# Patient Record
Sex: FEMALE | Race: WHITE | NOT HISPANIC OR LATINO | Employment: FULL TIME | URBAN - METROPOLITAN AREA
[De-identification: names, ages, dates, MRNs, and addresses within clinical notes are randomized per-mention and may not be internally consistent; named-entity substitution may affect disease eponyms.]

---

## 2018-04-19 ENCOUNTER — NON-PROVIDER VISIT (OUTPATIENT)
Dept: URGENT CARE | Facility: PHYSICIAN GROUP | Age: 23
End: 2018-04-19

## 2018-04-19 DIAGNOSIS — Z11.1 PPD SCREENING TEST: ICD-10-CM

## 2018-04-19 PROCEDURE — 86580 TB INTRADERMAL TEST: CPT | Performed by: FAMILY MEDICINE

## 2018-04-19 NOTE — PROGRESS NOTES
Michelle Mccartney is a 22 y.o. female here for a non-provider visit for:   {MA VISIT IZ:35608}    Reason for immunization: {MA VISIT IZ REASON:05875}  Immunization records indicate need for vaccine: {MA YES NO IMM REC:21703}  Minimum interval has been met for this vaccine: {YES (DEF)/NO:52830}  ABN completed: {YES/NO/NOT INDICATED:43691}    Order and dose verified by: ***  VIS Dated  *** was given to patient: {YES (DEF)/NO:72390}  {MA VISIT IAC QUESTIONAIRE:40115}    Patient tolerated injection and no adverse effects were observed or reported: {YES (DEF)/NO:04400}    Pt scheduled for next dose in series: {YES/NO/NOT INDICATED:52978}

## 2018-04-19 NOTE — PROGRESS NOTES
Michelle Mccartney is a 22 y.o. female here for a non-provider visit for PPD placement -- Step 1 of 1    Reason for PPD:  work requirement    1. TB evaluation questionnaire completed by patient? Yes      -  If any answers marked yes did you contact a provider prior to placing? Not Indicated  2.  Patient notified to return to clinic for reading on: 04/21/2018 and 4/22/2018  3.  PPD Placement documentation completed on TB evaluation questionnaire? Yes  4.  Location of TB evaluation questionnaire filed: [facility]

## 2018-04-24 ENCOUNTER — NON-PROVIDER VISIT (OUTPATIENT)
Dept: URGENT CARE | Facility: PHYSICIAN GROUP | Age: 23
End: 2018-04-24

## 2018-04-24 DIAGNOSIS — Z11.1 PPD SCREENING TEST: ICD-10-CM

## 2018-04-24 PROCEDURE — 86580 TB INTRADERMAL TEST: CPT | Performed by: FAMILY MEDICINE

## 2018-04-26 ENCOUNTER — NON-PROVIDER VISIT (OUTPATIENT)
Dept: URGENT CARE | Facility: PHYSICIAN GROUP | Age: 23
End: 2018-04-26

## 2018-04-26 LAB — TB WHEAL 3D P 5 TU DIAM: NORMAL MM

## 2018-04-26 NOTE — NON-PROVIDER
Michelle Mccartney is a 22 y.o. female here for a non-provider visit for PPD reading -- Step 1 of 1.      1.  Resulted in Epic under enter/edit results? Yes   2.  TB evaluation questionnaire scanned into chart and original given to patient?Yes      3. Was induration greater than 0 mm? Yes, verified by Provider: induration was 4mm we do not need to do an xray if under 10mm.       Routed to PCP? No

## 2019-04-18 ENCOUNTER — NON-PROVIDER VISIT (OUTPATIENT)
Dept: URGENT CARE | Facility: PHYSICIAN GROUP | Age: 24
End: 2019-04-18

## 2019-04-18 DIAGNOSIS — Z11.1 PPD SCREENING TEST: ICD-10-CM

## 2019-04-18 PROCEDURE — 86580 TB INTRADERMAL TEST: CPT | Performed by: FAMILY MEDICINE

## 2019-04-20 ENCOUNTER — NON-PROVIDER VISIT (OUTPATIENT)
Dept: URGENT CARE | Facility: PHYSICIAN GROUP | Age: 24
End: 2019-04-20

## 2019-04-20 LAB — TB WHEAL 3D P 5 TU DIAM: NORMAL MM

## 2019-07-18 ENCOUNTER — GYNECOLOGY VISIT (OUTPATIENT)
Dept: OBGYN | Facility: CLINIC | Age: 24
End: 2019-07-18
Payer: MEDICAID

## 2019-07-18 ENCOUNTER — TELEPHONE (OUTPATIENT)
Dept: OBGYN | Facility: CLINIC | Age: 24
End: 2019-07-18

## 2019-07-18 VITALS — DIASTOLIC BLOOD PRESSURE: 76 MMHG | SYSTOLIC BLOOD PRESSURE: 112 MMHG | BODY MASS INDEX: 36.68 KG/M2 | WEIGHT: 194 LBS

## 2019-07-18 DIAGNOSIS — Z12.4 CERVICAL CANCER SCREENING: ICD-10-CM

## 2019-07-18 DIAGNOSIS — N97.9 SECONDARY FEMALE INFERTILITY: ICD-10-CM

## 2019-07-18 DIAGNOSIS — E66.9 OBESITY (BMI 35.0-39.9 WITHOUT COMORBIDITY): ICD-10-CM

## 2019-07-18 PROCEDURE — 99203 OFFICE O/P NEW LOW 30 MIN: CPT | Performed by: OBSTETRICS & GYNECOLOGY

## 2019-07-18 NOTE — NON-PROVIDER
Pt here for pap and questions regarding infertility  Pt states been trying to get pregnancy for 4 yrs.  Good#[phone]  Pharmacy verified

## 2019-07-18 NOTE — PROGRESS NOTES
GYN Visit    CC:  Desires pregnancy    HPI: Ms. Michelle Mccartney is a 24 y.o.  here for infertility evaluation.   Pt reports she has been trying to get pregnant for 4yrs since she had a mirena IUD removed.  She has unprotected intercourse multiple times per week.   q28d cycle, bleeds 3-4d; only first day is heavy, denies pain between cycles, minimal pain with cycles  Has tried OPKs took for 2-3mos, reports she was taking it for approx 1 week around 2nd week.  1 prior pregnancy, no trouble conceiving.  Has gained some weight since then.    History of STI: no   Abdomina/pelvic surgeries: no    Partner:   Age: 34  Medical problems: no  Tobacco use: no, drug use: no  Fathered other children: yes, father of her prior pregnancy       ROS:  gen: denies fevers, weight changes, general concerns  Eyes: neg  ENT: neg  CV:  Neg  Resp: neg  abd: denies abdominal pain, GI concerns  : denies pelvic pain, dyspareunia, irregular vaginal bleeding, vaginal discharge, dysuria  Endocrine: denies acne, changes in hair growth patterns, hotflashes/nighsweats, nipple discharge    History reviewed. No pertinent past medical history.  Past Surgical History:   Procedure Laterality Date   • PRIMARY C SECTION  2013    Performed by Lino Yanes M.D. at [facility]       Social History   Substance Use Topics   • Smoking status: Never Smoker   • Smokeless tobacco: Never Used   • Alcohol use Yes      Comment: Socially      FamHx: denies mproblems    Physical Exam:  /76   Wt 88 kg (194 lb)   BMI 36.68 kg/m²   gen: AAO, NAD  Neck: supple, no thyromegaly/masses  CV: RRR, no LE edema  resp: ctab  abd: soft, NT, ND, no masses, no hernias  : NEFG, normal urethral meatus, perineum and anus.  Normal vagina and cervix   Uterus small size, anteverted, no adnexal masses or tenderness   No uterosacral nodularity/tenderness  Skin: warm/dry, no lesions    A/P:  Discussed pregnancy requires 4 things to work to achieve  pregnancy and this is how I model workup as well.   Ovaries: ovulatory by history, no obvious signs of hyperandrogens.  will check day 3 FSH/estradiol, AMH as well as testosterone/DHEA-S/TSH, A1c   To use ovulation predictor kits   Consider progesterone level to confirm ovulation  Uterus: ultrasound rx given, cycles sound normal  Tubes: no risk factors for tubal disease although recommend HSG if possible, rx given and told to call to schedule during menses for right after cessation of menses.  Will need stat blood hCG same day as imaging, rx given  Sperm: low suspicion for male factor, will defer for now.    Recommend folic acid non    F/U in 4-6wks after workup to discuss results      Diane Landa MD  [facility], Women's Health

## 2019-07-18 NOTE — TELEPHONE ENCOUNTER
Thea from [facility] called stating we needed to change order for HSG to [address], per [employer] at [facility]. Notified Thea we did not tell patient to go there or specified location on order. Adv Thea they need to notify patient. Thea understood and had no further questions

## 2019-07-30 ENCOUNTER — HOSPITAL ENCOUNTER (OUTPATIENT)
Dept: RADIOLOGY | Facility: MEDICAL CENTER | Age: 24
End: 2019-07-30
Attending: OBSTETRICS & GYNECOLOGY
Payer: MEDICAID

## 2019-07-30 DIAGNOSIS — N97.9 SECONDARY FEMALE INFERTILITY: ICD-10-CM

## 2019-07-30 PROCEDURE — 76830 TRANSVAGINAL US NON-OB: CPT

## 2019-07-31 ENCOUNTER — TELEPHONE (OUTPATIENT)
Dept: OBGYN | Facility: CLINIC | Age: 24
End: 2019-07-31

## 2019-07-31 NOTE — TELEPHONE ENCOUNTER
Jesenia from the prior authorization dept called states she received a call from [facility] declining auth for U/S and hysterosalpingogram tests due to infertility Dx. Medicaid doesn't cover any infertility testing.    If any questions. Provider to call for a pear to pear consultation to [facility] medicaid @ [phone].    U/s ref #[id number]  And hysterosalpingogram ref#[id number]    Pt had u/s done on 7/30/19 and scheduled for hysterosalpingogram on 8/13.    msg will be sent to provider for advise.

## 2019-08-02 ENCOUNTER — TELEPHONE (OUTPATIENT)
Dept: OBGYN | Facility: CLINIC | Age: 24
End: 2019-08-02

## 2019-08-02 NOTE — TELEPHONE ENCOUNTER
I called to notify of insurance not covering infertility testing.  Was informed that if she continue getting tests done she will pay out of packet for those test and u/s already done.  Pt would like to continue w/testing.    States she will pay out of packet      ----- Message from Diane Landa M.D. sent at 8/1/2019  4:34 PM PDT -----  Regarding: RE: u/s refused  If you could notify patient will be great.  We discussed that she may not have coverage for all of the work-up related to her infertility.    ----- Message -----  From: Jacquie Amato, Med Ass't  Sent: 7/31/2019   3:42 PM PDT  To: Diane Landa M.D.  Subject: u/s refused                                      Jesenia Saab Dr. from the prior authorization dept called states she received a call from [facility] declining auth for U/S and hysterosalpingogram tests due to infertility Dx. Medicaid doesn't cover any infertility testing.    If any questions. Provider to call for a pear to pear consultation to \[facility]\"" medicaid @ [phone].    U/s ref #[id number]  And hysterosalpingogram ref#[id number]    Pt had u/s done on 7/30/19 and scheduled for hysterosalpingogram on 8/13.      Please let me know if I just need to notify pt or if you are going to call.  Thank you

## 2019-08-13 ENCOUNTER — APPOINTMENT (OUTPATIENT)
Dept: RADIOLOGY | Facility: MEDICAL CENTER | Age: 24
End: 2019-08-13
Attending: OBSTETRICS & GYNECOLOGY
Payer: MEDICAID

## 2019-08-20 ENCOUNTER — GYNECOLOGY VISIT (OUTPATIENT)
Dept: OBGYN | Facility: CLINIC | Age: 24
End: 2019-08-20
Payer: MEDICAID

## 2019-08-20 VITALS — WEIGHT: 193 LBS | SYSTOLIC BLOOD PRESSURE: 118 MMHG | DIASTOLIC BLOOD PRESSURE: 70 MMHG | BODY MASS INDEX: 36.49 KG/M2

## 2019-08-20 DIAGNOSIS — N97.0 INFERTILITY ASSOCIATED WITH ANOVULATION: ICD-10-CM

## 2019-08-20 PROCEDURE — 99213 OFFICE O/P EST LOW 20 MIN: CPT | Performed by: OBSTETRICS & GYNECOLOGY

## 2019-08-21 NOTE — PROGRESS NOTES
Subjective:      Michelle Mccartney is a 24 y.o. female who presents for f/u            HPI patient is a 24-year-old G1, P1 who presents today for follow-up of infertility evaluation.  She had ultrasound and blood work and would like to discuss results and discuss treatment.  Patient has no complaints currently.  She is taking prenatal vitamins and folic acid    ROS all organ systems were reviewed and were negative       Objective:     /70   Wt 87.5 kg (193 lb)   BMI 36.49 kg/m²      Physical Exam   Constitutional: She appears well-developed and well-nourished. No distress.   Skin: She is not diaphoretic.   Psychiatric: She has a normal mood and affect. Her behavior is normal. Judgment and thought content normal.   Nursing note and vitals reviewed.       Discussion:    #1. I discussed the ultrasound finding which shows normal anatomy except for polycystic appearing ovaries    #2. I discussed serum blood testing including ovarian function testing, prolactin, testosterone levels which were all within normal limits.    #3. I discussed treatment options including ovulation induction with Clomid and patient desires this treatment.  I reviewed Clomid therapy in detail including risks, benefits and possible complications with this medication.  Potential side effects were reviewed.  I discussed how to take medication 5 days/month, I reviewed timed intercourse, I discussed need for day 21-serum progesterone to make sure she is on an appropriate dose of Clomid and patient desires to start therapy.  Written instructions were provided and instructions and information on Clomid were also provided.       Assessment/Plan:     1. Infertility associated with anovulation  Patient presents today to discuss treatment plan results.  Results are within normal limits including ultrasound and blood work.  Patient desires Clomid therapy and was counseled on Clomid use and agrees for usage.  Precautions were discussed.  Clomid  was prescribed.  Written information were provided  - clomiPHENE (CLOMID) 50 MG tablet; Take 1 Tab by mouth every day.  Dispense: 15 Tab; Refill: 4    2.  Standing order for day 21 progesterone level placed    3.  Patient to continue prenatal vitamin and folic acid    We discussed plan for 6 months of Clomid therapy.  We discussed that if she cannot conceive during this timeframe she will be referred to infertility specialist and patient agrees.    22 minutes spent with patient today.  All time was for face-to-face counseling

## 2020-01-16 ENCOUNTER — GYNECOLOGY VISIT (OUTPATIENT)
Dept: OBGYN | Facility: CLINIC | Age: 25
End: 2020-01-16
Payer: MEDICAID

## 2020-01-16 VITALS — DIASTOLIC BLOOD PRESSURE: 64 MMHG | BODY MASS INDEX: 36.68 KG/M2 | SYSTOLIC BLOOD PRESSURE: 108 MMHG | WEIGHT: 194 LBS

## 2020-01-16 DIAGNOSIS — N97.9 INFERTILITY, FEMALE: ICD-10-CM

## 2020-01-16 PROCEDURE — 99214 OFFICE O/P EST MOD 30 MIN: CPT | Performed by: OBSTETRICS & GYNECOLOGY

## 2020-01-16 NOTE — PROGRESS NOTES
24 y.o.  female previously seen for : Chief Complaint:  infertility    Specialty Problems     None      . Patient now here in follow up.     Patient's last menstrual period was 2020.      Subjective: Abdominal Pain: negative    Vaginal Bleedingnegative  Menstrual Cycle: normal: negative  Dysmenorrhea:negative:   Dyspareunia:negative  Urinary Symptoms: negative   Vaginal Discharge:{No          Current Outpatient Medications:   •  clomiPHENE (CLOMID) 50 MG tablet, Take 1 Tab by mouth every day., Disp: 15 Tab, Rfl: 4  •  hydrocodone-acetaminophen (NORCO) 5-325 MG Tab per tablet, Take 1-2 Tabs by mouth every four hours as needed (prn moderate pain). (Patient not taking: Reported on 2019), Disp: 15 Tab, Rfl: 0  ROS: no change in ROS since visit of : 1/15/2020.  :No results found for this or any previous visit (from the past 336 hour(s)).    Vitals:    20 1402   BP: 108/64   Weight: 88 kg (194 lb)     No past medical history on file.  PGYN:   Social History     Socioeconomic History   • Marital status: Single     Spouse name: Not on file   • Number of children: Not on file   • Years of education: Not on file   • Highest education level: Not on file   Occupational History   • Not on file   Social Needs   • Financial resource strain: Not on file   • Food insecurity:     Worry: Not on file     Inability: Not on file   • Transportation needs:     Medical: Not on file     Non-medical: Not on file   Tobacco Use   • Smoking status: Never Smoker   • Smokeless tobacco: Never Used   Substance and Sexual Activity   • Alcohol use: Yes     Comment: Socially    • Drug use: No   • Sexual activity: Yes     Partners: Male     Comment: none   Lifestyle   • Physical activity:     Days per week: Not on file     Minutes per session: Not on file   • Stress: Not on file   Relationships   • Social connections:     Talks on phone: Not on file     Gets together: Not on file     Attends [employer] service: Not on  file     Active member of club or organization: Not on file     Attends meetings of clubs or organizations: Not on file     Relationship status: Not on file   • Intimate partner violence:     Fear of current or ex partner: Not on file     Emotionally abused: Not on file     Physically abused: Not on file     Forced sexual activity: Not on file   Other Topics Concern   • Not on file   Social History Narrative   • Not on file     No family history on file.  Past Surgical History:   Procedure Laterality Date   • PRIMARY C SECTION  4/29/2013    Performed by Lino Yanes M.D. at [facility]         Exam: deferred   Ass: 2nd infertility : attempting x 4 yrs   Spent 30 minutes minutes with the patient ; Face to Face, with >50% of this time spent in counseling and coordination of care, surrounding the above mentioned issues as well as: discussed need to begin , more intensive management of her fertility Rx . Patient has been at home taking  Clomid 50 mg Days 5-9, without any lab work to assess if meds effective .   No Semen analysis , etc . Patient as well not doing ovulation prediction . Will need to consider HSG as well P. Patient given instruction sheet : fertility check list        Hold meds for now         Progesterone  Days 21 and days 25    RTC after this   PNV .   Will need couple months off  Clomid . Or may switch to  Letrozole 2.5 mg days 3-7 Follow up : Return visit in 4 week(s)

## 2020-01-16 NOTE — LETTER
January 16, 2020        Michelle Mccartney      Infertility Check Sheet    First Day of period : day 1 of Menses.  ( all labs, appointments are centered around knowing this day)    Medication:     Clomid : take days (3-7)   Or  ( days 5-9)  From first day of menses    Urine Ovulation Prediction:  Days 11-16   from first day of menses    No sex from days 11-16    Timed [place]: 12-36 hrs from urine test postive  ( May have sex at 12/24 and 36 hr interval)    Additional Tests:   PCT ( post -coital test) Have sex day prior to urine ovulation surge). Present to doctors office within 2 hrs of sexual intercourse ( no shower, douching , etc)'  HSG : radiology exam , timed first 7 days of cycle. This test is only done , after others are negative, or if you have strong history of pelvic surgeries , and/or prior Ectopic Pregnancy    Blood Test:  You must get these tests every month , or clomid will not be refilled     Day 21 and day 25 from first day of menses    Schedule follow up appointment from day 27-30                             Jhoan Bella M.D.

## 2020-01-16 NOTE — NON-PROVIDER
Pt is here to follow up on taking clomid  She is still not pregnant and would like to discus different options  LMP 1/8/20

## 2020-01-21 ENCOUNTER — OFFICE VISIT (OUTPATIENT)
Dept: MEDICAL GROUP | Facility: MEDICAL CENTER | Age: 25
End: 2020-01-21
Attending: NURSE PRACTITIONER
Payer: MEDICAID

## 2020-01-21 VITALS
SYSTOLIC BLOOD PRESSURE: 90 MMHG | HEIGHT: 63 IN | TEMPERATURE: 98.5 F | HEART RATE: 56 BPM | WEIGHT: 190.1 LBS | OXYGEN SATURATION: 99 % | BODY MASS INDEX: 33.68 KG/M2 | DIASTOLIC BLOOD PRESSURE: 60 MMHG

## 2020-01-21 DIAGNOSIS — Z13.0 SCREENING FOR DEFICIENCY ANEMIA: ICD-10-CM

## 2020-01-21 DIAGNOSIS — Z13.21 ENCOUNTER FOR VITAMIN DEFICIENCY SCREENING: ICD-10-CM

## 2020-01-21 DIAGNOSIS — E66.9 OBESITY (BMI 30-39.9): ICD-10-CM

## 2020-01-21 DIAGNOSIS — Z23 NEED FOR VACCINATION: ICD-10-CM

## 2020-01-21 DIAGNOSIS — Z76.89 ENCOUNTER TO ESTABLISH CARE: ICD-10-CM

## 2020-01-21 DIAGNOSIS — Z11.3 ROUTINE SCREENING FOR STI (SEXUALLY TRANSMITTED INFECTION): ICD-10-CM

## 2020-01-21 DIAGNOSIS — Z13.228 SCREENING FOR METABOLIC DISORDER: ICD-10-CM

## 2020-01-21 DIAGNOSIS — Z13.6 SCREENING FOR CARDIOVASCULAR CONDITION: ICD-10-CM

## 2020-01-21 PROCEDURE — 99213 OFFICE O/P EST LOW 20 MIN: CPT | Mod: 25 | Performed by: NURSE PRACTITIONER

## 2020-01-21 PROCEDURE — 90686 IIV4 VACC NO PRSV 0.5 ML IM: CPT

## 2020-01-21 PROCEDURE — 99395 PREV VISIT EST AGE 18-39: CPT | Performed by: NURSE PRACTITIONER

## 2020-01-21 ASSESSMENT — PATIENT HEALTH QUESTIONNAIRE - PHQ9: CLINICAL INTERPRETATION OF PHQ2 SCORE: 0

## 2020-01-21 ASSESSMENT — ENCOUNTER SYMPTOMS
CONSTIPATION: 0
DIARRHEA: 0
WHEEZING: 0
COUGH: 0
FEVER: 0
WEIGHT LOSS: 0
SHORTNESS OF BREATH: 0
PALPITATIONS: 0
BLOOD IN STOOL: 0
ABDOMINAL PAIN: 0
CHILLS: 0

## 2020-01-21 NOTE — ASSESSMENT & PLAN NOTE
Prior PCP: Kenya Coleman    Other Providers:  [facility]    She is working with pregnancy center to try and get pregnant.  She has been trying for about 4 years.  She returns for an appt in 2020 for a f/u and to create a plan.  She is having regular periods. She is taking a  vitamin with folic acid.  She wants to get labs drawn to ensure she is in good health to get pregnant.

## 2020-01-21 NOTE — PROGRESS NOTES
Chief Complaint   Patient presents with   • Establish Care       Subjective:     HPI:   Michelle Mccartney is a 24 y.o. female here to establish care, lab requests,  and to discuss the evaluation and management of:      Encounter to establish care  Prior PCP: Kenya Coleman    Other Providers:  [facility]    She is working with pregnancy center to try and get pregnant.  She has been trying for about 4 years.  She returns for an appt in 2020 for a f/u and to create a plan.  She is having regular periods. She is taking a  vitamin with folic acid.  She wants to get labs drawn to ensure she is in good health to get pregnant.       ROS  Review of Systems   Constitutional: Negative for chills, fever, malaise/fatigue and weight loss.   Respiratory: Negative for cough, shortness of breath and wheezing.    Cardiovascular: Negative for chest pain, palpitations and leg swelling.   Gastrointestinal: Negative for abdominal pain, blood in stool, constipation and diarrhea.         No Known Allergies    Current medicines (including changes today)  Current Outpatient Medications   Medication Sig Dispense Refill   • Prenatal Vit-Fe Fumarate-FA (PRENATAL 1+1 PO) Take 1 Tab by mouth.       No current facility-administered medications for this visit.      She  has a past medical history of Anemia. She also has no past medical history of Allergy, ASTHMA, Blood transfusion, CATARACT, COPD, Diabetes, EMPHYSEMA, Headache(784.0), HIV (human immunodeficiency virus infection), Migraine, OSTEOPOROSIS, Ulcer, or Urinary tract infection, site not specified.  She  has a past surgical history that includes primary c section (2013).  Social History     Tobacco Use   • Smoking status: Never Smoker   • Smokeless tobacco: Never Used   Substance Use Topics   • Alcohol use: Yes     Comment: Socially    • Drug use: No       Family History   Family history unknown: Yes     Family Status   Relation Name Status   • Mo  Alive   •  "Fa  Alive   • Sis  Alive   • Bro  Alive   • MGMo  Alive   • MGFa  Alive   • PGMo  Other   • PGFa  Other   • Sis  Alive   • Tyree  Alive       Patient Active Problem List    Diagnosis Date Noted   • Encounter to establish care 01/21/2020   • Obesity (BMI 30-39.9) 01/21/2020   • Back pain 01/26/2015   • Obesity 01/26/2015          Objective:     BP (!) 90/60 (BP Location: Left arm, Patient Position: Sitting, BP Cuff Size: Adult)   Pulse (!) 56   Temp 36.9 °C (98.5 °F) (Temporal)   Ht 1.588 m (5' 2.5\")   Wt 86.2 kg (190 lb 1.6 oz)   SpO2 99%  Body mass index is 34.22 kg/m².    Physical Exam:  Physical Exam   Constitutional: She is oriented to person, place, and time and well-developed, well-nourished, and in no distress. No distress.   HENT:   Head: Normocephalic.   Right Ear: Tympanic membrane and external ear normal.   Left Ear: Tympanic membrane and external ear normal.   Eyes: Pupils are equal, round, and reactive to light. Conjunctivae and EOM are normal.   Neck: Normal range of motion. Neck supple. No tracheal deviation present.   Cardiovascular: Normal rate, regular rhythm, normal heart sounds and intact distal pulses.   Pulmonary/Chest: Effort normal and breath sounds normal.   Abdominal: Soft. Bowel sounds are normal.   Musculoskeletal: Normal range of motion.   Lymphadenopathy:        Head (right side): No preauricular adenopathy present.        Head (left side): No preauricular adenopathy present.     She has no cervical adenopathy.   Neurological: She is alert and oriented to person, place, and time. She has normal sensation, normal strength and intact cranial nerves. Gait normal.   Skin: Skin is warm and dry.   Psychiatric: Affect and judgment normal.     I have placed the below orders and discussed them with an approved delegating provider.  The MA is performing the below orders under the direction of Dr. Ziegler.       Assessment and Plan:     The following treatment plan was discussed:    1. " Encounter to establish care  New pt.  Labs ordered, see below for more info.  We ensure she is not anemic going into a potential pregnancy.  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     2. Need for vaccination  Influenza Vaccine Quad Injection (PF)   3. Obesity (BMI 30-39.9)  Patient identified as having weight management issue.  Appropriate orders and counseling given.   4. Screening for metabolic disorder  HEMOGLOBIN A1C    TSH WITH REFLEX TO FT4   5. Screening for cardiovascular condition     6. Screening for deficiency anemia  CBC WITHOUT DIFFERENTIAL   7. Encounter for vitamin deficiency screening  VITAMIN D,25 HYDROXY   8. Routine screening for STI (sexually transmitted infection)  HIV AG/AB COMBO ASSAY SCREENING    RPR (SYPHILIS)    Chlamydia/GC PCR Urine Or Swab       Any change or worsening of signs or symptoms, patient encouraged to follow-up or report to emergency room for further evaluation. Patient verbalizes understanding and agrees.    Follow-Up: Return if symptoms worsen or fail to improve.      PLEASE NOTE: This dictation was created using voice recognition software. I have made every reasonable attempt to correct obvious errors, but I expect that there are errors of grammar and possibly content that I did not discover before finalizing the note.

## 2020-01-30 DIAGNOSIS — N97.9 INFERTILITY, FEMALE: ICD-10-CM

## 2020-02-03 DIAGNOSIS — E55.9 VITAMIN D DEFICIENCY: ICD-10-CM

## 2020-02-03 RX ORDER — ERGOCALCIFEROL 1.25 MG/1
50000 CAPSULE ORAL
Qty: 8 CAP | Refills: 0 | Status: SHIPPED | OUTPATIENT
Start: 2020-02-03 | End: 2022-06-16

## 2020-02-03 NOTE — RESULT ENCOUNTER NOTE
Called and spoke to patient regarding all results.  Negative for HIV, diabetes, thyroid abnormalities, and anemia.  Vitamin D level was 16.  She is currently trying to get pregnant but is not pregnant now.  We will do ergocalciferol 50,000 units twice a week for 1 month and then recheck her level.  Hopefully we will be able to get this up to normal prior to her pregnancy.

## 2020-02-03 NOTE — PROGRESS NOTES
Vitamin D level 16, ergocalciferol 50,000 units twice weekly sent to pharmacy.  She is actively trying to get pregnant, expect the patient on the phone right now she is not currently pregnant.  We discussed taking this medication twice a week for 1 month and then rechecking her level, patient agreeable.  We will mail her a lab slip to recheck her level when she completes her prescription.

## 2020-02-13 ENCOUNTER — GYNECOLOGY VISIT (OUTPATIENT)
Dept: OBGYN | Facility: CLINIC | Age: 25
End: 2020-02-13
Payer: MEDICAID

## 2020-02-13 VITALS — WEIGHT: 186 LBS | BODY MASS INDEX: 33.48 KG/M2 | SYSTOLIC BLOOD PRESSURE: 118 MMHG | DIASTOLIC BLOOD PRESSURE: 66 MMHG

## 2020-02-13 DIAGNOSIS — N97.9 INFERTILITY, FEMALE: ICD-10-CM

## 2020-02-13 PROCEDURE — 99213 OFFICE O/P EST LOW 20 MIN: CPT | Performed by: OBSTETRICS & GYNECOLOGY

## 2020-02-13 NOTE — NON-PROVIDER
Pt here today for a GYN f/u.  Pt states that she was told to come back for an appointment after her labs were done.  Phone # [phone]  Pharmacy verified.

## 2020-02-13 NOTE — PROGRESS NOTES
24 y.o.  female previously seen for : Chief Complaint:  infertility    Specialty Problems     None      . Patient now here in follow up. Patient last seen 2020 , with c/o of 4 yrs of attempting pregnancy . Did have prior pregnancy : 6 yrs ago , with C/S . PAtient was asked     No LMP recorded. (Menstrual status: Other).  2020   2020  Interval : 32 days   Patient did not get Semen Analysis from [facility]  Did not check ovulation   Only performed day 21 blood test , not day 25     Subjective: Abdominal Pain: negative    Vaginal Bleedingnegative  Menstrual Cycle: normal: positive  Dysmenorrhea:positive:   Dyspareunia:negative  Urinary Symptoms: negative   Vaginal Discharge:{No          Current Outpatient Medications:   •  ergocalciferol (DRISDOL) 92703 UNIT capsule, Take 1 Cap by mouth every 3 days. (Patient not taking: Reported on 2020), Disp: 8 Cap, Rfl: 0  •  Prenatal Vit-Fe Fumarate-FA (PRENATAL 1+1 PO), Take 1 Tab by mouth., Disp: , Rfl:   ROS: no change in ROS since visit of : 2020.  :No results found for this or any previous visit (from the past 336 hour(s)).    Vitals:    20 1314   BP: 118/66   BP Location: Right arm   Patient Position: Sitting   Weight: 84.4 kg (186 lb)     Past Medical History:   Diagnosis Date   • Anemia     with pregnancy      PGYN:   Social History     Socioeconomic History   • Marital status: Single     Spouse name: Not on file   • Number of children: Not on file   • Years of education: Not on file   • Highest education level: Not on file   Occupational History   • Not on file   Social Needs   • Financial resource strain: Not on file   • Food insecurity     Worry: Not on file     Inability: Not on file   • Transportation needs     Medical: Not on file     Non-medical: Not on file   Tobacco Use   • Smoking status: Never Smoker   • Smokeless tobacco: Never Used   Substance and Sexual Activity   • Alcohol use: Yes     Comment: Socially    • Drug  use: No   • Sexual activity: Yes     Partners: Male     Comment: none   Lifestyle   • Physical activity     Days per week: Not on file     Minutes per session: Not on file   • Stress: Not on file   Relationships   • Social connections     Talks on phone: Not on file     Gets together: Not on file     Attends Synagogue service: Not on file     Active member of club or organization: Not on file     Attends meetings of clubs or organizations: Not on file     Relationship status: Not on file   • Intimate partner violence     Fear of current or ex partner: Not on file     Emotionally abused: Not on file     Physically abused: Not on file     Forced sexual activity: Not on file   Other Topics Concern   • Not on file   Social History Narrative   • Not on file     Family History   Family history unknown: Yes     Past Surgical History:   Procedure Laterality Date   • PRIMARY C SECTION  4/29/2013    Performed by Lino Yanes M.D. at [facility]         Exam: deferred     Day 21 : progesterone : 5.9     Ass: Borderline ovulatory response           Incomplete blood work and SA assessment       Spent 15 minutes minutes with the patient ; Face to Face, with >50% of this time spent in counseling and coordination of care, surrounding the above mentioned issues as well as: need to adher to schedule to assess problem  Prior to Rx .   P. Continue PNV       Need PRL, Testosterone       Prog  Day 21/25   Lab slips given       Urine ovulation prediction       Need SA   Follow up : Return visit in 4 week(s)

## 2020-02-13 NOTE — LETTER
February 13, 2020        Michelle Mccartney        Infertility Check Sheet    First Day of period : day 1 of Menses.  ( all labs, appointments are centered around knowing this day)    Medication:     Clomid : take days (3-7)   Or  ( days 5-9)  From first day of menses    Urine Ovulation Prediction:  Days 11-16   from first day of menses    No sex from days 11-16    Timed [place]: 12-36 hrs from urine test postive  ( May have sex at 12/24 and 36 hr interval)    Additional Tests:   PCT ( post -coital test) Have sex day prior to urine ovulation surge). Present to doctors office within 2 hrs of sexual intercourse ( no shower, douching , etc)'  HSG : radiology exam , timed first 7 days of cycle. This test is only done , after others are negative, or if you have strong history of pelvic surgeries , and/or prior Ectopic Pregnancy    Blood Test:  You must get these tests every month , or clomid will not be refilled     Day 21 and day 25 from first day of menses    Schedule follow up appointment from day 27-30                           Jhoan Bella M.D.

## 2021-06-08 ENCOUNTER — HOSPITAL ENCOUNTER (OUTPATIENT)
Dept: LAB | Facility: MEDICAL CENTER | Age: 26
End: 2021-06-08
Attending: STUDENT IN AN ORGANIZED HEALTH CARE EDUCATION/TRAINING PROGRAM
Payer: COMMERCIAL

## 2021-06-08 LAB
BASOPHILS # BLD AUTO: 1 % (ref 0–1.8)
BASOPHILS # BLD: 0.08 K/UL (ref 0–0.12)
CHOLEST SERPL-MCNC: 146 MG/DL (ref 100–199)
EOSINOPHIL # BLD AUTO: 0.1 K/UL (ref 0–0.51)
EOSINOPHIL NFR BLD: 1.2 % (ref 0–6.9)
ERYTHROCYTE [DISTWIDTH] IN BLOOD BY AUTOMATED COUNT: 44.6 FL (ref 35.9–50)
EST. AVERAGE GLUCOSE BLD GHB EST-MCNC: 91 MG/DL
HBA1C MFR BLD: 4.8 % (ref 4–5.6)
HCT VFR BLD AUTO: 40.7 % (ref 37–47)
HDLC SERPL-MCNC: 49 MG/DL
HGB BLD-MCNC: 13.3 G/DL (ref 12–16)
IMM GRANULOCYTES # BLD AUTO: 0.05 K/UL (ref 0–0.11)
IMM GRANULOCYTES NFR BLD AUTO: 0.6 % (ref 0–0.9)
LDLC SERPL CALC-MCNC: 77 MG/DL
LYMPHOCYTES # BLD AUTO: 2.37 K/UL (ref 1–4.8)
LYMPHOCYTES NFR BLD: 29.6 % (ref 22–41)
MCH RBC QN AUTO: 29.4 PG (ref 27–33)
MCHC RBC AUTO-ENTMCNC: 32.7 G/DL (ref 33.6–35)
MCV RBC AUTO: 89.8 FL (ref 81.4–97.8)
MONOCYTES # BLD AUTO: 0.43 K/UL (ref 0–0.85)
MONOCYTES NFR BLD AUTO: 5.4 % (ref 0–13.4)
NEUTROPHILS # BLD AUTO: 4.98 K/UL (ref 2–7.15)
NEUTROPHILS NFR BLD: 62.2 % (ref 44–72)
NRBC # BLD AUTO: 0 K/UL
NRBC BLD-RTO: 0 /100 WBC
PLATELET # BLD AUTO: 297 K/UL (ref 164–446)
PMV BLD AUTO: 9.5 FL (ref 9–12.9)
RBC # BLD AUTO: 4.53 M/UL (ref 4.2–5.4)
TRIGL SERPL-MCNC: 98 MG/DL (ref 0–149)
TSH SERPL DL<=0.005 MIU/L-ACNC: 1.11 UIU/ML (ref 0.38–5.33)
WBC # BLD AUTO: 8 K/UL (ref 4.8–10.8)

## 2021-06-08 PROCEDURE — 87591 N.GONORRHOEAE DNA AMP PROB: CPT

## 2021-06-08 PROCEDURE — 80061 LIPID PANEL: CPT

## 2021-06-08 PROCEDURE — 83036 HEMOGLOBIN GLYCOSYLATED A1C: CPT

## 2021-06-08 PROCEDURE — 36415 COLL VENOUS BLD VENIPUNCTURE: CPT

## 2021-06-08 PROCEDURE — 87536 HIV-1 QUANT&REVRSE TRNSCRPJ: CPT

## 2021-06-08 PROCEDURE — 86780 TREPONEMA PALLIDUM: CPT

## 2021-06-08 PROCEDURE — 85025 COMPLETE CBC W/AUTO DIFF WBC: CPT

## 2021-06-08 PROCEDURE — 86701 HIV-1ANTIBODY: CPT

## 2021-06-08 PROCEDURE — 87491 CHLMYD TRACH DNA AMP PROBE: CPT

## 2021-06-08 PROCEDURE — 84443 ASSAY THYROID STIM HORMONE: CPT

## 2021-06-08 PROCEDURE — 86702 HIV-2 ANTIBODY: CPT

## 2021-06-09 LAB
C TRACH DNA SPEC QL NAA+PROBE: NEGATIVE
N GONORRHOEA DNA SPEC QL NAA+PROBE: NEGATIVE
SPECIMEN SOURCE: NORMAL
TREPONEMA PALLIDUM IGG+IGM AB [PRESENCE] IN SERUM OR PLASMA BY IMMUNOASSAY: NORMAL

## 2021-06-10 LAB
HIV 1 & 2 AB SER-IMP: NORMAL
HIV 1 & 2 AB SERPL IA.RAPID: NORMAL
HIV 2 AB SERPL QL IA: NEGATIVE
HIV1 AB SERPL QL IA: NEGATIVE

## 2021-06-12 LAB
HIV-1 NAAT (COPIES/ML) L204479A: NOT DETECTED CPY/ML
HIV-1 NAAT (LOG COPIES/ML) L295410: NOT DETECTED LOG CPY/ML
HIV1 RNA SERPL QL NAA+PROBE: NOT DETECTED

## 2021-11-22 ENCOUNTER — OFFICE VISIT (OUTPATIENT)
Dept: MEDICAL GROUP | Facility: CLINIC | Age: 26
End: 2021-11-22
Payer: COMMERCIAL

## 2021-11-22 VITALS
WEIGHT: 173 LBS | RESPIRATION RATE: 18 BRPM | OXYGEN SATURATION: 98 % | HEART RATE: 94 BPM | DIASTOLIC BLOOD PRESSURE: 68 MMHG | BODY MASS INDEX: 32.66 KG/M2 | HEIGHT: 61 IN | SYSTOLIC BLOOD PRESSURE: 106 MMHG | TEMPERATURE: 98 F

## 2021-11-22 DIAGNOSIS — Z11.3 SCREEN FOR STD (SEXUALLY TRANSMITTED DISEASE): ICD-10-CM

## 2021-11-22 DIAGNOSIS — N30.00 ACUTE CYSTITIS WITHOUT HEMATURIA: ICD-10-CM

## 2021-11-22 PROCEDURE — 99212 OFFICE O/P EST SF 10 MIN: CPT | Performed by: FAMILY MEDICINE

## 2021-11-22 ASSESSMENT — PATIENT HEALTH QUESTIONNAIRE - PHQ9: CLINICAL INTERPRETATION OF PHQ2 SCORE: 0

## 2021-11-22 NOTE — PROGRESS NOTES
"    CC:           STD testing                                                                                                                           HPI:   Michelle presents today with the following.  She would like to be screened for STDs.  She is not having any symptoms, she does not have any history of STD and she is not concerned that she had any sexual activity with anyone who did have an STD.    Patient Active Problem List    Diagnosis Date Noted   • Screen for STD (sexually transmitted disease) 11/22/2021   • Encounter to establish care 01/21/2020   • Obesity (BMI 30-39.9) 01/21/2020   • Back pain 01/26/2015   • Obesity 01/26/2015       Current Outpatient Medications   Medication Sig Dispense Refill   • ergocalciferol (DRISDOL) 68590 UNIT capsule Take 1 Cap by mouth every 3 days. (Patient not taking: Reported on 2/13/2020) 8 Cap 0   • Prenatal Vit-Fe Fumarate-FA (PRENATAL 1+1 PO) Take 1 Tab by mouth.       No current facility-administered medications for this visit.         Allergies as of 11/22/2021   • (No Known Allergies)          /68 (BP Location: Left arm, Patient Position: Sitting, BP Cuff Size: Adult)   Pulse 94   Temp 36.7 °C (98 °F) (Temporal)   Resp 18   Ht 1.549 m (5' 1\")   Wt 78.5 kg (173 lb)   SpO2 98%   BMI 32.69 kg/m²     Physical Exam:  Gen:         Alert and oriented, No apparent distress.  Neck:        No Lymphadenopathy or Bruits.  Lungs:     Clear to auscultation bilaterally  CV:          Regular rate and rhythm. No murmurs, rubs or gallops.               Ext:          No clubbing, cyanosis, edema.      Assessment and Plan.     Screen for STD (sexually transmitted disease)  She is requesting to be tested today for STDs.  She is not complaining of any symptoms of itching pain burning discharge or foul odor, she has no concerns for direct contact.  We will check a gonorrhea, chlamydia, HIV, RPR, and hepatitis C.  At this point she declined the need for any " contraception.

## 2021-11-22 NOTE — ASSESSMENT & PLAN NOTE
She is requesting to be tested today for STDs.  She is not complaining of any symptoms of itching pain burning discharge or foul odor, she has no concerns for direct contact.  We will check a gonorrhea, chlamydia, HIV, RPR, and hepatitis C.  At this point she declined the need for any contraception.

## 2021-12-07 ENCOUNTER — HOSPITAL ENCOUNTER (OUTPATIENT)
Dept: LAB | Facility: MEDICAL CENTER | Age: 26
End: 2021-12-07
Attending: FAMILY MEDICINE
Payer: COMMERCIAL

## 2021-12-07 DIAGNOSIS — Z11.3 SCREEN FOR STD (SEXUALLY TRANSMITTED DISEASE): ICD-10-CM

## 2021-12-07 DIAGNOSIS — N30.00 ACUTE CYSTITIS WITHOUT HEMATURIA: ICD-10-CM

## 2021-12-07 LAB
APPEARANCE UR: ABNORMAL
BACTERIA #/AREA URNS HPF: NEGATIVE /HPF
BILIRUB UR QL STRIP.AUTO: NEGATIVE
COLOR UR: YELLOW
EPI CELLS #/AREA URNS HPF: NEGATIVE /HPF
GLUCOSE UR STRIP.AUTO-MCNC: NEGATIVE MG/DL
HCV AB SER QL: NORMAL
HIV 1+2 AB+HIV1 P24 AG SERPL QL IA: NORMAL
HYALINE CASTS #/AREA URNS LPF: NORMAL /LPF
KETONES UR STRIP.AUTO-MCNC: NEGATIVE MG/DL
LEUKOCYTE ESTERASE UR QL STRIP.AUTO: ABNORMAL
MICRO URNS: ABNORMAL
NITRITE UR QL STRIP.AUTO: NEGATIVE
PH UR STRIP.AUTO: 6 [PH] (ref 5–8)
PROT UR QL STRIP: NEGATIVE MG/DL
RBC # URNS HPF: NORMAL /HPF
RBC UR QL AUTO: NEGATIVE
SP GR UR STRIP.AUTO: 1.02
TREPONEMA PALLIDUM IGG+IGM AB [PRESENCE] IN SERUM OR PLASMA BY IMMUNOASSAY: NORMAL
WBC #/AREA URNS HPF: NORMAL /HPF

## 2021-12-07 PROCEDURE — 86780 TREPONEMA PALLIDUM: CPT

## 2021-12-07 PROCEDURE — 87591 N.GONORRHOEAE DNA AMP PROB: CPT

## 2021-12-07 PROCEDURE — 81001 URINALYSIS AUTO W/SCOPE: CPT

## 2021-12-07 PROCEDURE — 87491 CHLMYD TRACH DNA AMP PROBE: CPT

## 2021-12-07 PROCEDURE — 86803 HEPATITIS C AB TEST: CPT

## 2021-12-07 PROCEDURE — 87389 HIV-1 AG W/HIV-1&-2 AB AG IA: CPT

## 2021-12-07 PROCEDURE — 36415 COLL VENOUS BLD VENIPUNCTURE: CPT

## 2021-12-08 LAB
C TRACH DNA SPEC QL NAA+PROBE: NEGATIVE
N GONORRHOEA DNA SPEC QL NAA+PROBE: NEGATIVE
SPECIMEN SOURCE: NORMAL

## 2022-06-16 ENCOUNTER — OFFICE VISIT (OUTPATIENT)
Dept: MEDICAL GROUP | Facility: CLINIC | Age: 27
End: 2022-06-16
Payer: COMMERCIAL

## 2022-06-16 ENCOUNTER — HOSPITAL ENCOUNTER (OUTPATIENT)
Facility: MEDICAL CENTER | Age: 27
End: 2022-06-16
Attending: STUDENT IN AN ORGANIZED HEALTH CARE EDUCATION/TRAINING PROGRAM
Payer: COMMERCIAL

## 2022-06-16 VITALS
DIASTOLIC BLOOD PRESSURE: 77 MMHG | BODY MASS INDEX: 33.04 KG/M2 | OXYGEN SATURATION: 93 % | HEART RATE: 65 BPM | WEIGHT: 175 LBS | SYSTOLIC BLOOD PRESSURE: 113 MMHG | HEIGHT: 61 IN | RESPIRATION RATE: 12 BRPM

## 2022-06-16 DIAGNOSIS — R21 RASH: ICD-10-CM

## 2022-06-16 DIAGNOSIS — N89.8 VAGINAL ODOR: ICD-10-CM

## 2022-06-16 LAB
CANDIDA DNA VAG QL PROBE+SIG AMP: NEGATIVE
G VAGINALIS DNA VAG QL PROBE+SIG AMP: POSITIVE
T VAGINALIS DNA VAG QL PROBE+SIG AMP: NEGATIVE

## 2022-06-16 PROCEDURE — 87529 HSV DNA AMP PROBE: CPT

## 2022-06-16 PROCEDURE — 87591 N.GONORRHOEAE DNA AMP PROB: CPT

## 2022-06-16 PROCEDURE — 87660 TRICHOMONAS VAGIN DIR PROBE: CPT

## 2022-06-16 PROCEDURE — 87480 CANDIDA DNA DIR PROBE: CPT

## 2022-06-16 PROCEDURE — 87798 DETECT AGENT NOS DNA AMP: CPT

## 2022-06-16 PROCEDURE — 99213 OFFICE O/P EST LOW 20 MIN: CPT | Mod: GE | Performed by: STUDENT IN AN ORGANIZED HEALTH CARE EDUCATION/TRAINING PROGRAM

## 2022-06-16 PROCEDURE — 87510 GARDNER VAG DNA DIR PROBE: CPT

## 2022-06-16 PROCEDURE — 87491 CHLMYD TRACH DNA AMP PROBE: CPT

## 2022-06-16 RX ORDER — VALACYCLOVIR HYDROCHLORIDE 1 G/1
1000 TABLET, FILM COATED ORAL 3 TIMES DAILY
Qty: 21 TABLET | Refills: 0 | Status: SHIPPED | OUTPATIENT
Start: 2022-06-16 | End: 2022-06-23

## 2022-06-16 NOTE — ASSESSMENT & PLAN NOTE
18-year-old female with vaginal odor for 4 months.  Not sexually active.  Discussed options.  Patient agreeable to pelvic exam with gc/cl,bv, and yeast cultures. Patient is agreeable.    Procedure: Pelvic exam  Indications: Foul vaginal odor  Performing: Valentino Palacios MD with Gema Stern as a chaperone  The external genitalia is normal.  No odor present.  Speculum exam reveals physiologic vaginal mucus.  Swabs for BV, yeast, and GC/CL were obtained.  The speculum was removed.  Then a bimanual exam was performed with no cervical motion tenderness.  The ovaries were nonpalpable.      Plan:  - Sent for cultures  - will notify patient of results.

## 2022-06-16 NOTE — ASSESSMENT & PLAN NOTE
26-year-old female with 2 days of painful itchy vesicular rash on her chest.  It crosses the midline.  It is very consistent with zoster rash, however, in the midline is quite unusual.  Differential diagnosis at this time includes, is not limited to zoster versus HSV 1 or 2 versus Sweet syndrome versus dermatitis herpetiformis.    Plan:  - Given her presentation and the appearance of the lesions I highly suspect herpes virus.  - Start valacyclovir 1 g p.o. twice daily for 7 days  - Viral PCR for HSV 1 and 2 as well as varicella were obtained and sent  - will call the patient with results.

## 2022-06-16 NOTE — PROGRESS NOTES
[facility]    Chief Complaint   Patient presents with   • Sexually Transmitted Diseases   • Rash     2 days ago       HISTORY OF PRESENT ILLNESS: Patient is a 26 y.o. female established patient who presents today to discuss the medical issues below:    Problem   Rash    Rash for 2 days. Painful and itchy.  Is concerned.  Patient reports history of chickenpox as a child.  She also reports oral cold sores at times.  Denies any fever, chills, sweats, or other complaints.     Vaginal Odor    Patient concerned over patient vaginal odor for about 4 months.  She has not had intercourse in over a year.  She denies any vaginal discharge, itching, frequent washing, burning with urination, or other complaints.          Patient Active Problem List    Diagnosis Date Noted   • Rash 06/16/2022   • Vaginal odor 06/16/2022   • Screen for STD (sexually transmitted disease) 11/22/2021   • Encounter to establish care 01/21/2020   • Obesity (BMI 30-39.9) 01/21/2020   • Back pain 01/26/2015   • Obesity 01/26/2015       Allergies:Patient has no known allergies.    Current Outpatient Medications   Medication Sig Dispense Refill   • valacyclovir (VALTREX) 1 GM Tab Take 1 Tablet by mouth 3 times a day for 7 days. 21 Tablet 0     No current facility-administered medications for this visit.         Past Medical History:   Diagnosis Date   • Anemia     with pregnancy 2013       Social History     Tobacco Use   • Smoking status: Never Smoker   • Smokeless tobacco: Never Used   Vaping Use   • Vaping Use: Never used   Substance Use Topics   • Alcohol use: Not Currently     Comment: Socially    • Drug use: No       Family Status   Relation Name Status   • Mo  Alive   • Fa  Alive   • Sis  Alive   • Bro  Alive   • MGMo  Alive   • MGFa  Alive   • PGMo  Other   • PGFa  Other   • Sis  Alive   • Tyree  Alive     Family History   Family history unknown: Yes       ROS:  Negative except as stated above.      Exam:    /77 (BP Location: Right  "arm, Patient Position: Sitting, BP Cuff Size: Adult)   Pulse 65   Resp 12   Ht 1.549 m (5' 1\")   Wt 79.4 kg (175 lb)   SpO2 93%  Body mass index is 33.07 kg/m².   General: Well-appearing and in no acute distress  HEENT: MMM, EOMI  Lungs: No respiratory distress or audible wheezing  Heart: Pulse present.  Abdomen: Nondistended.  Skin: Patch of vesicles on the sternum in the midline about 2 x 3 cm in diameter.  After cleaning the area with alcohol prep an 18-gauge needle was used to rupture the best: Obtain a sample.  EXT: Warm and well-perfused  Neuro: Nonfocal    Assessment/Plan:    Vaginal odor  18-year-old female with vaginal odor for 4 months.  Not sexually active.  Discussed options.  Patient agreeable to pelvic exam with gc/cl,bv, and yeast cultures. Patient is agreeable.    Procedure: Pelvic exam  Indications: Foul vaginal odor  Performing: Valentino Palacios MD with Gema Jarred as a chaperone  The external genitalia is normal.  No odor present.  Speculum exam reveals physiologic vaginal mucus.  Swabs for BV, yeast, and GC/CL were obtained.  The speculum was removed.  Then a bimanual exam was performed with no cervical motion tenderness.  The ovaries were nonpalpable.      Plan:  - Sent for cultures  - will notify patient of results.    Rash  26-year-old female with 2 days of painful itchy vesicular rash on her chest.  It crosses the midline.  It is very consistent with zoster rash, however, in the midline is quite unusual.  Differential diagnosis at this time includes, is not limited to zoster versus HSV 1 or 2 versus Sweet syndrome versus dermatitis herpetiformis.    Plan:  - Given her presentation and the appearance of the lesions I highly suspect herpes virus.  - Start valacyclovir 1 g p.o. twice daily for 7 days  - Viral PCR for HSV 1 and 2 as well as varicella were obtained and sent  - will call the patient with results.     "

## 2022-06-17 LAB
C TRACH DNA GENITAL QL NAA+PROBE: NEGATIVE
N GONORRHOEA DNA GENITAL QL NAA+PROBE: NEGATIVE
SPECIMEN SOURCE: NORMAL

## 2022-06-17 RX ORDER — METRONIDAZOLE 500 MG/1
500 TABLET ORAL 2 TIMES DAILY
Qty: 14 TABLET | Refills: 0 | Status: SHIPPED | OUTPATIENT
Start: 2022-06-17 | End: 2022-06-24

## 2022-06-17 NOTE — PROGRESS NOTES
Pt tested positive for Gardnerella vaginalis. Will send in metronidazole 500 mg Po BiD for 7 days. Tried to call and notify the patient but she did not answer and her mailbox is not set up. Will send my chart message as well.

## 2022-06-20 LAB
HSV1 DNA CSF QL NAA+PROBE: DETECTED
HSV2 DNA CSF QL NAA+PROBE: NOT DETECTED
SPECIMEN SOURCE: ABNORMAL
SPECIMEN SOURCE: NORMAL
VZV DNA SPEC QL NAA+PROBE: NOT DETECTED

## 2023-02-17 ENCOUNTER — OFFICE VISIT (OUTPATIENT)
Dept: MEDICAL GROUP | Facility: CLINIC | Age: 28
End: 2023-02-17
Payer: COMMERCIAL

## 2023-02-17 VITALS
OXYGEN SATURATION: 96 % | SYSTOLIC BLOOD PRESSURE: 109 MMHG | RESPIRATION RATE: 16 BRPM | BODY MASS INDEX: 33.61 KG/M2 | WEIGHT: 178 LBS | HEART RATE: 51 BPM | DIASTOLIC BLOOD PRESSURE: 74 MMHG | HEIGHT: 61 IN | TEMPERATURE: 97.7 F

## 2023-02-17 DIAGNOSIS — R14.0 ABDOMINAL BLOATING: ICD-10-CM

## 2023-02-17 PROCEDURE — 99213 OFFICE O/P EST LOW 20 MIN: CPT | Mod: GE | Performed by: STUDENT IN AN ORGANIZED HEALTH CARE EDUCATION/TRAINING PROGRAM

## 2023-02-17 NOTE — PROGRESS NOTES
"HPI  Michelle Mccartney is a 27 y.o. female presenting for abdominal bloating and inability to lose weight.    Problem   Abdominal Bloating    Concerned with abdominal bloating and inability to lose weight. Concerned because she works out every day and has not lost weight, and any food she eats causes abdominal bloating.  Reports that she usually only eats 1-2 meals per day, consists of bread products, sandwiches, no specific foods that seem to trigger it.  Bowel movements every 2 days, these are soft.              PMH   Past Medical History:   Diagnosis Date    Anemia     with pregnancy 2013       Past Surgical History:   Procedure Laterality Date    PRIMARY C SECTION  4/29/2013    Performed by Lino Yanes M.D. at [facility]       Social History     Tobacco Use    Smoking status: Never    Smokeless tobacco: Never   Substance Use Topics    Alcohol use: Not Currently     Comment: Socially        Family History   Family history unknown: Yes         PE  /74 (BP Location: Left arm, Patient Position: Sitting, BP Cuff Size: Adult)   Pulse (!) 51   Temp 36.5 °C (97.7 °F) (Temporal)   Resp 16   Ht 1.549 m (5' 1\")   Wt 80.7 kg (178 lb)   SpO2 96%   BMI 33.63 kg/m²     Physical Exam  Constitutional:       Appearance: Normal appearance.   HENT:      Head: Normocephalic and atraumatic.      Nose: Nose normal.      Mouth/Throat:      Mouth: Mucous membranes are moist.   Eyes:      Extraocular Movements: Extraocular movements intact.      Conjunctiva/sclera: Conjunctivae normal.   Cardiovascular:      Rate and Rhythm: Normal rate and regular rhythm.      Pulses: Normal pulses.      Heart sounds: Normal heart sounds. No murmur heard.  Pulmonary:      Effort: Pulmonary effort is normal. No respiratory distress.      Breath sounds: Normal breath sounds.   Abdominal:      General: Abdomen is flat. Bowel sounds are normal. There is no distension.      Palpations: Abdomen is soft. There is no mass.     "  Tenderness: There is abdominal tenderness (Mild tenderness periumbilical). There is no guarding or rebound.      Hernia: No hernia is present.   Musculoskeletal:         General: Normal range of motion.      Cervical back: Normal range of motion and neck supple.   Skin:     General: Skin is warm and dry.   Neurological:      General: No focal deficit present.      Mental Status: She is alert and oriented to person, place, and time. Mental status is at baseline.   Psychiatric:         Mood and Affect: Mood normal.         Behavior: Behavior normal.        A/P:  Abdominal bloating  Abdominal bloating with inability to lose weight - possible IBD vs IBS vs celiac vs food sensitivity  Lab eval ordered  Discussed elimination diets to identify aggravating foods, specifically provided information on high FODMAP foods  Advised food diary to determine primary causes of symptoms  Followup in 1 month with food diary and lab results

## 2023-02-17 NOTE — ASSESSMENT & PLAN NOTE
Abdominal bloating with inability to lose weight - possible IBD vs IBS vs celiac vs food sensitivity  Lab eval ordered  Discussed elimination diets to identify aggravating foods, specifically provided information on high FODMAP foods  Advised food diary to determine primary causes of symptoms  Followup in 1 month with food diary and lab results

## 2023-03-03 ENCOUNTER — HOSPITAL ENCOUNTER (OUTPATIENT)
Dept: LAB | Facility: MEDICAL CENTER | Age: 28
End: 2023-03-03
Attending: STUDENT IN AN ORGANIZED HEALTH CARE EDUCATION/TRAINING PROGRAM
Payer: COMMERCIAL

## 2023-03-03 DIAGNOSIS — R14.0 ABDOMINAL BLOATING: ICD-10-CM

## 2023-03-03 LAB
BASOPHILS # BLD AUTO: 0.8 % (ref 0–1.8)
BASOPHILS # BLD: 0.05 K/UL (ref 0–0.12)
EOSINOPHIL # BLD AUTO: 0.06 K/UL (ref 0–0.51)
EOSINOPHIL NFR BLD: 1 % (ref 0–6.9)
ERYTHROCYTE [DISTWIDTH] IN BLOOD BY AUTOMATED COUNT: 44.8 FL (ref 35.9–50)
ERYTHROCYTE [SEDIMENTATION RATE] IN BLOOD BY WESTERGREN METHOD: 15 MM/HOUR (ref 0–25)
HCT VFR BLD AUTO: 39.3 % (ref 37–47)
HGB BLD-MCNC: 12.9 G/DL (ref 12–16)
IMM GRANULOCYTES # BLD AUTO: 0.02 K/UL (ref 0–0.11)
IMM GRANULOCYTES NFR BLD AUTO: 0.3 % (ref 0–0.9)
LYMPHOCYTES # BLD AUTO: 1.96 K/UL (ref 1–4.8)
LYMPHOCYTES NFR BLD: 32.1 % (ref 22–41)
MCH RBC QN AUTO: 28.6 PG (ref 27–33)
MCHC RBC AUTO-ENTMCNC: 32.8 G/DL (ref 33.6–35)
MCV RBC AUTO: 87.1 FL (ref 81.4–97.8)
MONOCYTES # BLD AUTO: 0.42 K/UL (ref 0–0.85)
MONOCYTES NFR BLD AUTO: 6.9 % (ref 0–13.4)
NEUTROPHILS # BLD AUTO: 3.59 K/UL (ref 2–7.15)
NEUTROPHILS NFR BLD: 58.9 % (ref 44–72)
NRBC # BLD AUTO: 0 K/UL
NRBC BLD-RTO: 0 /100 WBC
PLATELET # BLD AUTO: 273 K/UL (ref 164–446)
PMV BLD AUTO: 10.8 FL (ref 9–12.9)
RBC # BLD AUTO: 4.51 M/UL (ref 4.2–5.4)
WBC # BLD AUTO: 6.1 K/UL (ref 4.8–10.8)

## 2023-03-03 PROCEDURE — 86140 C-REACTIVE PROTEIN: CPT

## 2023-03-03 PROCEDURE — 86364 TISS TRNSGLTMNASE EA IG CLAS: CPT

## 2023-03-03 PROCEDURE — 36415 COLL VENOUS BLD VENIPUNCTURE: CPT

## 2023-03-03 PROCEDURE — 85025 COMPLETE CBC W/AUTO DIFF WBC: CPT

## 2023-03-03 PROCEDURE — 85652 RBC SED RATE AUTOMATED: CPT

## 2023-03-03 PROCEDURE — 84443 ASSAY THYROID STIM HORMONE: CPT

## 2023-03-04 LAB
CRP SERPL HS-MCNC: <0.3 MG/DL (ref 0–0.75)
TSH SERPL DL<=0.005 MIU/L-ACNC: 0.86 UIU/ML (ref 0.38–5.33)

## 2023-03-06 LAB — TTG IGA SER IA-ACNC: <2 U/ML (ref 0–3)

## 2023-04-04 ENCOUNTER — OFFICE VISIT (OUTPATIENT)
Dept: MEDICAL GROUP | Facility: CLINIC | Age: 28
End: 2023-04-04
Payer: COMMERCIAL

## 2023-04-04 VITALS
WEIGHT: 182 LBS | HEART RATE: 67 BPM | HEIGHT: 62 IN | TEMPERATURE: 97.8 F | BODY MASS INDEX: 33.49 KG/M2 | RESPIRATION RATE: 16 BRPM | DIASTOLIC BLOOD PRESSURE: 69 MMHG | SYSTOLIC BLOOD PRESSURE: 109 MMHG | OXYGEN SATURATION: 98 %

## 2023-04-04 DIAGNOSIS — E66.09 CLASS 1 OBESITY DUE TO EXCESS CALORIES WITHOUT SERIOUS COMORBIDITY WITH BODY MASS INDEX (BMI) OF 33.0 TO 33.9 IN ADULT: ICD-10-CM

## 2023-04-04 LAB
HBA1C MFR BLD: 5 % (ref ?–5.8)
POCT INT CON NEG: NEGATIVE
POCT INT CON POS: POSITIVE

## 2023-04-04 PROCEDURE — 99213 OFFICE O/P EST LOW 20 MIN: CPT | Mod: GE | Performed by: STUDENT IN AN ORGANIZED HEALTH CARE EDUCATION/TRAINING PROGRAM

## 2023-04-04 PROCEDURE — 83036 HEMOGLOBIN GLYCOSYLATED A1C: CPT | Performed by: STUDENT IN AN ORGANIZED HEALTH CARE EDUCATION/TRAINING PROGRAM

## 2023-04-04 NOTE — PROGRESS NOTES
"Subjective:     CC: weight loss    HPI:   Michelle presents today with wanting to discuss weight loss    She would like to get back to approximately 169 lbs. She notes she last weighed this about 2 years ago. She has tried phentermine with no success.     She works out almost everyday. She admits that her diet is not the best, but she does not eat very much. She has family history of obesity.    She has had labs drawn for celiac and IBD with reassuring results.    Problem   Bmi 33.0-33.9,Adult       Current Outpatient Medications Ordered in Epic   Medication Sig Dispense Refill    Semaglutide,0.25 or 0.5MG/DOS, 2 MG/1.5ML Solution Pen-injector Inject 0.25 mg under the skin every 7 days. 12 Each 3     No current Epic-ordered facility-administered medications on file.       ROS:  Gen: no fevers, no chills  Eyes: no vision changes  ENT: no sore throat, no bloody nose  Pulm: no sob, no cough  CV: no chest pain, no palpitations  GI: no vomiting, no diarrhea  : no urinary changes  Skin: no rash  Neuro: no headaches, no numbness      Objective:     Exam:  /69 (BP Location: Left arm, Patient Position: Sitting, BP Cuff Size: Adult)   Pulse 67   Temp 36.6 °C (97.8 °F) (Temporal)   Resp 16   Ht 1.575 m (5' 2\")   Wt 82.6 kg (182 lb)   SpO2 98%   BMI 33.29 kg/m²  Body mass index is 33.29 kg/m².    Gen: Alert and oriented, No apparent distress.   Neck: Full range of motion, no lymphadenopathy  Lungs: Normal effort, no wheezing or rales  CV: Regular rate and rhythm. No murmurs  Ext: Moving all extremities, 2+ radial pulses bilaterally    Assessment & Plan:     27 y.o. female with the following -     Problem List Items Addressed This Visit       Obesity    Relevant Medications    Semaglutide,0.25 or 0.5MG/DOS, 2 MG/1.5ML Solution Pen-injector    Other Relevant Orders    Nutrition (Dietary) Consult    BMI 33.0-33.9,adult    Relevant Medications    Semaglutide,0.25 or 0.5MG/DOS, 2 MG/1.5ML Solution Pen-injector    Other " Relevant Orders    POCT  A1C (Completed)    Nutrition (Dietary) Consult       # BMI 33.29  Patient is desiring weight loss. Goal of approximately 169 lbs. She works out almost everyday. She admits she does not eat the best diet but does not eat very much. She has family history of obesity. TSH, ESR, CRP, TTG were all normal. A1c today in clinic was WNL.   - Nutrition referral  - continue exercise  - will attempt to get GLP approved  ------> semaglutide 0.25 mg SC once per week  - consider metformin, orlistat  - has tried phentermine with no improvement  - follow-up 3 months   8

## 2023-05-18 ENCOUNTER — OFFICE VISIT (OUTPATIENT)
Dept: MEDICAL GROUP | Facility: CLINIC | Age: 28
End: 2023-05-18
Payer: COMMERCIAL

## 2023-05-18 VITALS
DIASTOLIC BLOOD PRESSURE: 76 MMHG | SYSTOLIC BLOOD PRESSURE: 108 MMHG | OXYGEN SATURATION: 94 % | HEIGHT: 61 IN | HEART RATE: 80 BPM | WEIGHT: 183 LBS | TEMPERATURE: 97.6 F | BODY MASS INDEX: 34.55 KG/M2

## 2023-05-18 PROCEDURE — 99213 OFFICE O/P EST LOW 20 MIN: CPT | Mod: GE | Performed by: STUDENT IN AN ORGANIZED HEALTH CARE EDUCATION/TRAINING PROGRAM

## 2023-05-18 PROCEDURE — 3074F SYST BP LT 130 MM HG: CPT | Performed by: STUDENT IN AN ORGANIZED HEALTH CARE EDUCATION/TRAINING PROGRAM

## 2023-05-18 PROCEDURE — 3078F DIAST BP <80 MM HG: CPT | Performed by: STUDENT IN AN ORGANIZED HEALTH CARE EDUCATION/TRAINING PROGRAM

## 2023-05-18 RX ORDER — METFORMIN HYDROCHLORIDE 500 MG/1
500 TABLET, EXTENDED RELEASE ORAL 2 TIMES DAILY
Qty: 180 TABLET | Refills: 1 | Status: SHIPPED | OUTPATIENT
Start: 2023-05-18 | End: 2023-09-25

## 2023-05-18 ASSESSMENT — PATIENT HEALTH QUESTIONNAIRE - PHQ9: CLINICAL INTERPRETATION OF PHQ2 SCORE: 0

## 2023-05-18 NOTE — ASSESSMENT & PLAN NOTE
Discussed approaches at length again.  She has been consistent with lifestyle changes without evidence of benefit, still interested in medication options for weight loss  Discussed evidence behind weight watchers, encouraged her to try participating in this either virtually or in person, she will look into this  Due to ineffectiveness of lifestyle changes with weight loss, I am agreeable to starting medications today.  Discussed metformin and orlistat with her including potential benefits as well as side effects and risks.  She would like to proceed with metformin first.  We will start with metformin XR to minimize GI side effects- initially we will start with 500 mg daily for 1 to 2 weeks, if she tolerates this well will increase to twice daily.  Follow-up in 3 months and if she has effective weight loss at that point we can discuss continuing versus discontinuing metformin at that time.  If she has not experienced at least 5% weight loss at that time we will discuss increased dose of metformin versus transition to orlistat as another option.

## 2023-05-18 NOTE — PROGRESS NOTES
"HPI  Michelle Mccartney is a 27 y.o. female presenting for followup weight loss.    Problem   Bmi 34.0-34.9,Adult    Persistent concerns regarding obesity and desire to lose weight.  Has been continuing to follow previously discussed dietary changes as well as increased cardiovascular physical activity.  She goes to the gym at least 5 days a week for at least 30 minutes a day, and is eating overall well-balanced diet with limited fatty foods, high carbohydrate foods, fast foods, junk foods.  Concerned because she still has not lost any weight.  At last visit with Dr. Fountain attempted to start semaglutide; however, this was denied by insurance.                [facility]   Past Medical History:   Diagnosis Date    Anemia     with pregnancy 2013       Past Surgical History:   Procedure Laterality Date    PRIMARY C SECTION  4/29/2013    Performed by Lino Yanes M.D. at [facility]       Social History     Tobacco Use    Smoking status: Never    Smokeless tobacco: Never   Vaping Use    Vaping Use: Never used   Substance Use Topics    Alcohol use: Not Currently     Comment: Socially        Family History   Family history unknown: Yes         PE  /76   Pulse 80   Temp 36.4 °C (97.6 °F)   Ht 1.549 m (5' 1\")   Wt 83 kg (183 lb)   SpO2 94%   BMI 34.58 kg/m²     Physical Exam  Constitutional:       Appearance: Normal appearance.   HENT:      Head: Normocephalic and atraumatic.      Nose: Nose normal.      Mouth/Throat:      Mouth: Mucous membranes are moist.   Eyes:      Extraocular Movements: Extraocular movements intact.      Conjunctiva/sclera: Conjunctivae normal.   Pulmonary:      Effort: Pulmonary effort is normal. No respiratory distress.   Abdominal:      General: Abdomen is flat.   Musculoskeletal:         General: Normal range of motion.      Cervical back: Normal range of motion and neck supple.   Skin:     General: Skin is warm and dry.   Neurological:      General: No focal deficit " present.      Mental Status: She is alert and oriented to person, place, and time. Mental status is at baseline.   Psychiatric:         Mood and Affect: Mood normal.         Behavior: Behavior normal.          A/P:  BMI 34.0-34.9,adult  Discussed approaches at length again.  She has been consistent with lifestyle changes without evidence of benefit, still interested in medication options for weight loss  Discussed evidence behind weight watchers, encouraged her to try participating in this either virtually or in person, she will look into this  Due to ineffectiveness of lifestyle changes with weight loss, I am agreeable to starting medications today.  Discussed metformin and orlistat with her including potential benefits as well as side effects and risks.  She would like to proceed with metformin first.  We will start with metformin XR to minimize GI side effects- initially we will start with 500 mg daily for 1 to 2 weeks, if she tolerates this well will increase to twice daily.  Follow-up in 3 months and if she has effective weight loss at that point we can discuss continuing versus discontinuing metformin at that time.  If she has not experienced at least 5% weight loss at that time we will discuss increased dose of metformin versus transition to orlistat as another option.

## 2023-06-27 ENCOUNTER — OFFICE VISIT (OUTPATIENT)
Dept: MEDICAL GROUP | Facility: CLINIC | Age: 28
End: 2023-06-27
Payer: COMMERCIAL

## 2023-06-27 VITALS
HEART RATE: 53 BPM | SYSTOLIC BLOOD PRESSURE: 111 MMHG | BODY MASS INDEX: 34.55 KG/M2 | HEIGHT: 61 IN | RESPIRATION RATE: 16 BRPM | OXYGEN SATURATION: 97 % | DIASTOLIC BLOOD PRESSURE: 75 MMHG | WEIGHT: 183 LBS | TEMPERATURE: 97.9 F

## 2023-06-27 DIAGNOSIS — N89.8 VAGINAL ODOR: ICD-10-CM

## 2023-06-27 DIAGNOSIS — E66.9 OBESITY (BMI 30-39.9): ICD-10-CM

## 2023-06-27 DIAGNOSIS — R21 RASH: ICD-10-CM

## 2023-06-27 DIAGNOSIS — R14.0 ABDOMINAL BLOATING: ICD-10-CM

## 2023-06-27 PROCEDURE — 3078F DIAST BP <80 MM HG: CPT | Performed by: STUDENT IN AN ORGANIZED HEALTH CARE EDUCATION/TRAINING PROGRAM

## 2023-06-27 PROCEDURE — 3074F SYST BP LT 130 MM HG: CPT | Performed by: STUDENT IN AN ORGANIZED HEALTH CARE EDUCATION/TRAINING PROGRAM

## 2023-06-27 PROCEDURE — 99213 OFFICE O/P EST LOW 20 MIN: CPT | Mod: GE | Performed by: STUDENT IN AN ORGANIZED HEALTH CARE EDUCATION/TRAINING PROGRAM

## 2023-06-27 RX ORDER — PHENTERMINE HYDROCHLORIDE 15 MG/1
15 CAPSULE ORAL EVERY MORNING
Qty: 30 CAPSULE | Refills: 0 | Status: SHIPPED | OUTPATIENT
Start: 2023-06-27 | End: 2023-07-27

## 2023-06-27 RX ORDER — DULAGLUTIDE 0.75 MG/.5ML
0.5 INJECTION, SOLUTION SUBCUTANEOUS
Qty: 2 ML | Refills: 0 | Status: SHIPPED | OUTPATIENT
Start: 2023-06-27 | End: 2023-07-27

## 2023-06-27 NOTE — PROGRESS NOTES
"HPI  Michelle Mccartney is a 28 y.o. female presenting for followup weight loss.    Problem   Bmi 34.0-34.9,Adult    Persistent concerns regarding obesity and desire to lose weight.  Has been continuing to follow previously discussed dietary changes as well as increased cardiovascular physical activity.  She goes to the gym at least 5 days a week for at least 30 minutes a day, and is eating overall well-balanced diet with limited fatty foods, high carbohydrate foods, fast foods, junk foods.  Concerned because she still has not lost any weight.  At last visit with Dr. Fountain attempted to start semaglutide; however, this was denied by insurance.      Update: Started metformin at last visit, has not noticed any change in her weight since then.  Wants to trial a different GLP-1, or if this will not be covered, open to trying phentermine.     Abdominal Bloating (Resolved)   Rash (Resolved)   Vaginal Odor (Resolved)   Back Pain (Resolved)            PMH   Past Medical History:   Diagnosis Date    Anemia     with pregnancy 2013       Past Surgical History:   Procedure Laterality Date    PRIMARY C SECTION  4/29/2013    Performed by Lino Yanes M.D. at [facility]       Social History     Tobacco Use    Smoking status: Never    Smokeless tobacco: Never   Vaping Use    Vaping Use: Never used   Substance Use Topics    Alcohol use: Not Currently     Comment: Socially        Family History   Family history unknown: Yes         PE  /75 (BP Location: Left arm, Patient Position: Sitting, BP Cuff Size: Adult)   Pulse (!) 53   Temp 36.6 °C (97.9 °F) (Temporal)   Resp 16   Ht 1.549 m (5' 1\")   Wt 83 kg (183 lb)   SpO2 97%   BMI 34.58 kg/m²     Physical Exam  Constitutional:       Appearance: Normal appearance.   HENT:      Head: Normocephalic and atraumatic.      Nose: Nose normal.      Mouth/Throat:      Mouth: Mucous membranes are moist.   Eyes:      Extraocular Movements: Extraocular movements " intact.      Conjunctiva/sclera: Conjunctivae normal.   Pulmonary:      Effort: Pulmonary effort is normal. No respiratory distress.   Abdominal:      General: Abdomen is flat.   Musculoskeletal:         General: Normal range of motion.      Cervical back: Normal range of motion and neck supple.   Skin:     General: Skin is warm and dry.   Neurological:      General: No focal deficit present.      Mental Status: She is alert and oriented to person, place, and time. Mental status is at baseline.   Psychiatric:         Mood and Affect: Mood normal.         Behavior: Behavior normal.          A/P:  BMI 34.0-34.9,adult  Weight unchanged over the last 3 months despite physical activity, appropriate diet, and metformin  Reviewed medication formulary, Trulicity allegedly covered, so we will try to get this approved  I am open to phentermine if trulicity is not covered.  If Trulicity is not approved, will fill phentermine 15mg starting dose  She will check with pharmacy and only fill one or the other.  I anticipate that Trulicity will not be covered, so phentermine filled.      Either way followup in 1 month to review.  Trulicity can increase to 1.5mg if needed and phentermine can increase to 30mg if needed, whichever insurance is able to cover  Continue physical activity and dietary changes

## 2023-06-27 NOTE — ASSESSMENT & PLAN NOTE
Weight unchanged over the last 3 months despite physical activity, appropriate diet, and metformin  Reviewed medication formulary, Trulicity allegedly covered, so we will try to get this approved  I am open to phentermine if trulicity is not covered.  If Trulicity is not approved, will fill phentermine 15mg starting dose  She will check with pharmacy and only fill one or the other.  I anticipate that Trulicity will not be covered, so phentermine filled.      Either way followup in 1 month to review.  Trulicity can increase to 1.5mg if needed and phentermine can increase to 30mg if needed, whichever insurance is able to cover  Continue physical activity and dietary changes

## 2023-07-27 ENCOUNTER — APPOINTMENT (OUTPATIENT)
Dept: MEDICAL GROUP | Facility: CLINIC | Age: 28
End: 2023-07-27
Payer: COMMERCIAL

## 2023-08-14 ENCOUNTER — OFFICE VISIT (OUTPATIENT)
Dept: MEDICAL GROUP | Facility: CLINIC | Age: 28
End: 2023-08-14
Payer: COMMERCIAL

## 2023-08-14 PROCEDURE — 99213 OFFICE O/P EST LOW 20 MIN: CPT | Mod: GE

## 2023-08-14 RX ORDER — PHENTERMINE HYDROCHLORIDE 30 MG/1
30 CAPSULE ORAL EVERY MORNING
Qty: 30 CAPSULE | Refills: 1 | Status: SHIPPED | OUTPATIENT
Start: 2023-08-14 | End: 2023-09-25 | Stop reason: SDUPTHER

## 2023-08-14 NOTE — ASSESSMENT & PLAN NOTE
HbA1c in march '23 was 5.0.  TSH WNL.   -Phentermine increased to 30mg, for 2 more months, check up after that time and consider stopping or starting to wean off.  -Trulicity still waiting for approval  -F/u with pt at next visit to see if she is still taking the metformin.    -Recommended pt try at least one healthy eating/lifestyle tutu or program and report back on how she liked it.    -Referred for lifestyle medicine group visits.

## 2023-08-14 NOTE — PROGRESS NOTES
"Subjective:     CC: Desire for weight loss    HPI:   Michelle presents today to follow up with weight loss efforts.      Problem   Bmi 34.0-34.9,Adult    Pt was seen on 6/27 by Dr. Whitt and started on phentermine.  Pt did not experience any weight loss - she is still 183 lbs.  She felt good on the phentermine and would like to go up.  Pt had been started on metformin at previous visit without noticeable weight changes.  Trulicity had been sent for prior auth at previous visit and is still waiting for approval, pt aware this may not get approved.  Pt going to the gym with sister most days of the week and participating in moderate aerobic and weight training.  She is working on eating better but states she should stay away from pasta.  She eats twice a day but it is often a big meal.  She is not using any diet/healthy eating apps or programs at this time.          Current Outpatient Medications Ordered in Epic   Medication Sig Dispense Refill    phentermine 30 MG capsule Take 1 Capsule by mouth every morning for 60 days. 30 Capsule 1    metFORMIN ER (GLUCOPHAGE XR) 500 MG TABLET SR 24 HR Take 1 Tablet by mouth 2 times a day. 180 Tablet 1     No current Epic-ordered facility-administered medications on file.       Past Medical History:   Diagnosis Date    Anemia     with pregnancy 2013        Past Surgical History:   Procedure Laterality Date    PRIMARY C SECTION  4/29/2013    Performed by Lino Yanes M.D. at [facility]        Family History   Family history unknown: Yes            Objective:     Exam:  BP (P) 124/82 (BP Location: Left arm, Patient Position: Sitting, BP Cuff Size: Adult)   Pulse (P) 74   Resp (P) 16   Ht (P) 1.549 m (5' 1\")   Wt (P) 83 kg (183 lb)   SpO2 (P) 97%   BMI (P) 34.58 kg/m²  Body mass index is 34.58 kg/m² (pended).    Gen: Alert and oriented, No apparent distress. Overweight.   Head:  NCAT, EOMI, sclera clear without discharge  Neck: Neck is supple without " lymphadenopathy. No thyroid enlargement.    Lungs: Normal effort, CTA bilaterally, no wheezes, rhonchi, or rales  CV: Regular rate and rhythm. No murmurs, rubs, or gallops.  Abd:   Non-distended  Ext: No clubbing, cyanosis, edema.  MSK: Unassisted gait  Derm: No lesions on exposed skin.  Multiple tattoos.   Psych: normal mood and affect        Assessment & Plan:     28 y.o. female with the following -     Problem List Items Addressed This Visit       BMI 34.0-34.9,adult     HbA1c in march '23 was 5.0.  TSH WNL.   -Phentermine increased to 30mg, for 2 more months, check up after that time and consider stopping or starting to wean off.  -Trulicity still waiting for approval  -F/u with pt at next visit to see if she is still taking the metformin.    -Recommended pt try at least one healthy eating/lifestyle tutu or program and report back on how she liked it.    -Referred for lifestyle medicine group visits.          Relevant Medications    phentermine 30 MG capsule       Follow-up: Recommend f/u in 2 months with myself to assess weight loss efforts    Alexandra Iqbal D.O.  PGY-2

## 2023-08-15 ENCOUNTER — PATIENT MESSAGE (OUTPATIENT)
Dept: MEDICAL GROUP | Facility: CLINIC | Age: 28
End: 2023-08-15
Payer: COMMERCIAL

## 2023-09-25 ENCOUNTER — OFFICE VISIT (OUTPATIENT)
Dept: MEDICAL GROUP | Facility: CLINIC | Age: 28
End: 2023-09-25
Payer: COMMERCIAL

## 2023-09-25 VITALS
RESPIRATION RATE: 16 BRPM | DIASTOLIC BLOOD PRESSURE: 82 MMHG | BODY MASS INDEX: 33.99 KG/M2 | OXYGEN SATURATION: 97 % | SYSTOLIC BLOOD PRESSURE: 116 MMHG | WEIGHT: 180 LBS | HEIGHT: 61 IN | HEART RATE: 72 BPM

## 2023-09-25 PROCEDURE — 99213 OFFICE O/P EST LOW 20 MIN: CPT | Mod: GE

## 2023-09-25 PROCEDURE — 3074F SYST BP LT 130 MM HG: CPT

## 2023-09-25 PROCEDURE — 3079F DIAST BP 80-89 MM HG: CPT

## 2023-09-25 RX ORDER — PHENTERMINE HYDROCHLORIDE 30 MG/1
30 CAPSULE ORAL EVERY MORNING
Qty: 30 CAPSULE | Refills: 1 | Status: SHIPPED | OUTPATIENT
Start: 2023-09-25 | End: 2023-12-07 | Stop reason: SDUPTHER

## 2023-09-25 NOTE — PROGRESS NOTES
"Subjective:     CC: Phentermine refill    HPI:   Michelle presents today with request for phentermine refill.      Problem   Bmi 34.0-34.9,Adult    Patient doing well on phentermine but has only noticed slight weight loss.  She is no longer taking the metformin as she noted no effect with this.  She does not have any trouble with heart palpitations or elevated  blood pressure.  She would prefer to be on a GLP-1 if this would be approved.  Weight loss goals is to feel more comfortable, she noted she did feel much more comfortable or in her skin at 165 pounds.       Current Outpatient Medications Ordered in Epic   Medication Sig Dispense Refill    phentermine 30 MG capsule Take 1 Capsule by mouth every morning for 60 days. 30 Capsule 1    Liraglutide -Weight Management 18 MG/3ML Solution Pen-injector Inject 0.6 mL under the skin every day. 3 mL 3     No current [facility]-ordered facility-administered medications on file.       Past Medical History:   Diagnosis Date    Anemia     with pregnancy 2013        Past Surgical History:   Procedure Laterality Date    PRIMARY C SECTION  4/29/2013    Performed by Lino Yanes M.D. at [facility]        Family History   Family history unknown: Yes            Objective:     Exam:  /82 (BP Location: Right arm, Patient Position: Sitting, BP Cuff Size: Adult)   Pulse 72   Resp 16   Ht 1.549 m (5' 1\")   Wt 81.6 kg (180 lb)   SpO2 97%   BMI 34.01 kg/m²  Body mass index is 34.01 kg/m².    Gen: Alert and oriented, No apparent distress.  Head:  NCAT, EOMI, sclera clear without discharge  Neck: Neck is supple without lymphadenopathy.  Lungs: Normal effort  Abd:   Non-distended, soft  Ext: No clubbing, cyanosis, edema.  MSK: Unassisted gait  Derm: No lesions on exposed skin  Psych: normal mood and affect        Assessment & Plan:     28 y.o. female with the following -     Problem List Items Addressed This Visit       BMI 34.0-34.9,adult     Will refill phentermine today " but this is likely not the best option for patient.  She is most likely going to be determined a nonresponder as she would need to lose 9 pounds after 3 months to lose 5% of body weight, she has lost about 3 pounds at this time.  Patient would benefit from GLP-1 although she is not prediabetic, last A1c was 5.0 on 4/4/2023.  Patient has been counseled on exercise and healthy eating, she appears to be putting in valid effort.  -Phentermine 30 mg with 1 refill given.  - Trulicity will likely not be approved, thus changed prescription to Saxenda, will await prior Auth/approval.         Relevant Medications    phentermine 30 MG capsule    Liraglutide -Weight Management 18 MG/3ML Solution Pen-injector       Follow-up: NADEEN Iqbal D.O.  PGY-2

## 2023-09-26 NOTE — ASSESSMENT & PLAN NOTE
Will refill phentermine today but this is likely not the best option for patient.  She is most likely going to be determined a nonresponder as she would need to lose 9 pounds after 3 months to lose 5% of body weight, she has lost about 3 pounds at this time.  Patient would benefit from GLP-1 although she is not prediabetic, last A1c was 5.0 on 4/4/2023.  Patient has been counseled on exercise and healthy eating, she appears to be putting in valid effort.  -Phentermine 30 mg with 1 refill given.  - Trulicity will likely not be approved, thus changed prescription to Saxenda, will await prior Auth/approval.

## 2023-11-27 ENCOUNTER — APPOINTMENT (OUTPATIENT)
Dept: MEDICAL GROUP | Facility: CLINIC | Age: 28
End: 2023-11-27
Payer: COMMERCIAL

## 2023-11-30 ENCOUNTER — OFFICE VISIT (OUTPATIENT)
Dept: MEDICAL GROUP | Facility: CLINIC | Age: 28
End: 2023-11-30
Payer: COMMERCIAL

## 2023-11-30 VITALS
SYSTOLIC BLOOD PRESSURE: 118 MMHG | RESPIRATION RATE: 16 BRPM | HEIGHT: 61 IN | WEIGHT: 184 LBS | DIASTOLIC BLOOD PRESSURE: 66 MMHG | OXYGEN SATURATION: 99 % | HEART RATE: 75 BPM | BODY MASS INDEX: 34.74 KG/M2

## 2023-11-30 PROCEDURE — 99213 OFFICE O/P EST LOW 20 MIN: CPT | Mod: GE

## 2023-11-30 PROCEDURE — 3078F DIAST BP <80 MM HG: CPT

## 2023-11-30 PROCEDURE — 3074F SYST BP LT 130 MM HG: CPT

## 2023-11-30 RX ORDER — BUPROPION HYDROCHLORIDE 100 MG/1
100 TABLET ORAL 2 TIMES DAILY
Qty: 60 TABLET | Status: CANCELLED | OUTPATIENT
Start: 2023-11-30

## 2023-11-30 NOTE — PROGRESS NOTES
Subjective:     CC: ***    HPI:   Michelle presents today with ***      Social Hx:    No problems updated.    Current Outpatient Medications Ordered in Epic   Medication Sig Dispense Refill    Liraglutide -Weight Management 18 MG/3ML Solution Pen-injector Inject 0.6 mL under the skin every day. 3 mL 3     No current [facility]-ordered facility-administered medications on file.       Past Medical History:   Diagnosis Date    Anemia     with pregnancy 2013        Past Surgical History:   Procedure Laterality Date    PRIMARY C SECTION  4/29/2013    Performed by Lino Yanes M.D. at [facility]        Family History   Family history unknown: Yes          ROS:  Gen: no fevers/chills, no weight loss  Pulm: no sob, no cough  CV: no chest pain, no palpitations  GI: no nausea/vomiting, no diarrhea  : no dysuria  MSk: no myalgias  Skin: no rash  Neuro: no headaches, no weakness      Objective:     Exam:  There were no vitals taken for this visit. There is no height or weight on file to calculate BMI.    Gen: Alert and oriented, No apparent distress.  Head:  NCAT, EOMI, sclera clear without discharge  Neck: Neck is supple without lymphadenopathy.  Lungs: Normal effort, CTA bilaterally, no wheezes, rhonchi, or rales  CV: Regular rate and rhythm. No murmurs, rubs, or gallops.  Abd:   Non-distended, soft  Ext: No clubbing, cyanosis, edema.  MSK: Unassisted gait  Derm: No lesions on exposed skin  Psych: normal mood and affect    Physical Exam  Skin:     Findings: Lesion present.                  Assessment & Plan:     28 y.o. female with the following -     Problem List Items Addressed This Visit    None      I spent a total of *** minutes with record review, exam, communication with the patient, communication with other providers, and documentation of this encounter.    Follow-up: ***

## 2023-11-30 NOTE — PROGRESS NOTES
"Subjective:     CC: Weight loss efforts     HPI:   Michelle presents today to follow-up on weight loss efforts.      Problem   Bmi 34.0-34.9,Adult    Pt did not  last phentermine script because the pharmacy told her they didn't have it.   She does not have any trouble with heart palpitations or elevated  blood pressure.  She would prefer to be on a GLP-1 if this would be approved.  Weight loss goals is to feel more comfortable, she noted she did feel much more comfortable or in her skin at 165 pounds.  Attests to \"a little\" anxiety, denies hx of seizures.  Her main draw to food is often emotional eating but later mentioned she was often still hungry on phentermine.    Pt feels she is doing well on her diet and exercise regimen - eats light, exercises twice a day with dance and weights at the gym.           Social Hx:  Has children  Enjoys dancing     Current Outpatient Medications Ordered in Epic   Medication Sig Dispense Refill    Naltrexone-buPROPion HCl ER 8-90 MG TABLET SR 12 HR Take 1 Tablet by mouth every day. 60 Tablet 1    Liraglutide -Weight Management 18 MG/3ML Solution Pen-injector Inject 0.6 mL under the skin every day. 3 mL 3     No current [facility]-ordered facility-administered medications on file.       Past Medical History:   Diagnosis Date    Anemia     with pregnancy 2013        Past Surgical History:   Procedure Laterality Date    PRIMARY C SECTION  4/29/2013    Performed by Lino Yanes M.D. at [facility]        Family History   Family history unknown: Yes            Objective:     Exam:  /66 (BP Location: Left arm, Patient Position: Sitting, BP Cuff Size: Adult)   Pulse 75   Resp 16   Ht 1.549 m (5' 1\")   Wt 83.5 kg (184 lb)   SpO2 99%   BMI 34.77 kg/m²  Body mass index is 34.77 kg/m².    Gen: Alert and oriented, No apparent distress.  Head:  NCAT, EOMI, sclera clear without discharge  Neck: Neck is supple without lymphadenopathy.  Lungs: Normal effort  Abd:   " Non-distended, soft  Ext: No clubbing, cyanosis, edema.  MSK: Unassisted gait  Derm: No lesions on exposed skin  Psych: normal mood and affect        Assessment & Plan:     28 y.o. female with the following -     Problem List Items Addressed This Visit       BMI 34.0-34.9,adult     Saxenda prior-auth cannot be found by MA.  Unsure if still active, pt never heard back.    Last A1c was 5.0 on 4/4/23.  Lipids WNL in 2021.  No high BP or tachycardia.  Appears healthy besides BMI.    -Will re-prescribe Saxenda to see if it goes through.    -Starting contrave ( 8mg naltrexone/90mg buproprion), if not covered by insurance, will prescribe components separately.    -Counseled pt about possible increase with anxiety at start with bupropion.    -Can consider body weight composition test if pt desires in future- instructed pt that she is able to schedule this herself.           Relevant Medications    Naltrexone-buPROPion HCl ER 8-90 MG TABLET SR 12 HR    Liraglutide -Weight Management 18 MG/3ML Solution Pen-injector       Follow-up: 12/28 with myself.      Alexandra Iqbal D.O.  PGY-2

## 2023-12-01 NOTE — ASSESSMENT & PLAN NOTE
Saxenda prior-auth cannot be found by MA.  Unsure if still active, pt never heard back.    Last A1c was 5.0 on 4/4/23.  Lipids WNL in 2021.  No high BP or tachycardia.  Appears healthy besides BMI.    -Will re-prescribe Saxenda to see if it goes through.    -Starting contrave ( 8mg naltrexone/90mg buproprion), if not covered by insurance, will prescribe components separately.    -Counseled pt about possible increase with anxiety at start with bupropion.    -Can consider body weight composition test if pt desires in future- instructed pt that she is able to schedule this herself.

## 2023-12-08 RX ORDER — PHENTERMINE HYDROCHLORIDE 30 MG/1
30 CAPSULE ORAL EVERY MORNING
COMMUNITY
Start: 2023-11-30 | End: 2023-12-11

## 2023-12-11 RX ORDER — PHENTERMINE HYDROCHLORIDE 30 MG/1
30 CAPSULE ORAL EVERY MORNING
Qty: 30 CAPSULE | Refills: 1 | Status: SHIPPED | OUTPATIENT
Start: 2023-12-11 | End: 2024-02-09

## 2023-12-28 ENCOUNTER — OFFICE VISIT (OUTPATIENT)
Dept: MEDICAL GROUP | Facility: CLINIC | Age: 28
End: 2023-12-28
Payer: COMMERCIAL

## 2023-12-28 VITALS
RESPIRATION RATE: 14 BRPM | BODY MASS INDEX: 33.79 KG/M2 | HEIGHT: 61 IN | OXYGEN SATURATION: 97 % | HEART RATE: 65 BPM | SYSTOLIC BLOOD PRESSURE: 116 MMHG | DIASTOLIC BLOOD PRESSURE: 79 MMHG | WEIGHT: 179 LBS

## 2023-12-28 PROCEDURE — 99213 OFFICE O/P EST LOW 20 MIN: CPT | Mod: GE

## 2023-12-28 PROCEDURE — 3074F SYST BP LT 130 MM HG: CPT

## 2023-12-28 PROCEDURE — 3078F DIAST BP <80 MM HG: CPT

## 2023-12-28 RX ORDER — SULFAMETHOXAZOLE AND TRIMETHOPRIM 800; 160 MG/1; MG/1
TABLET ORAL
COMMUNITY
Start: 2021-09-14

## 2023-12-28 RX ORDER — NALTREXONE HYDROCHLORIDE 50 MG/1
50 TABLET, FILM COATED ORAL DAILY
Qty: 30 TABLET | Refills: 0 | Status: SHIPPED | OUTPATIENT
Start: 2023-12-28 | End: 2024-02-08 | Stop reason: SDUPTHER

## 2023-12-28 RX ORDER — NORGESTIMATE AND ETHINYL ESTRADIOL 7DAYSX3 LO
KIT ORAL
Qty: 28 TABLET | Refills: 11 | Status: SHIPPED | OUTPATIENT
Start: 2023-12-28

## 2023-12-28 RX ORDER — BUPROPION HYDROCHLORIDE 100 MG/1
100 TABLET ORAL 2 TIMES DAILY
Qty: 60 TABLET | Refills: 3 | Status: SHIPPED | OUTPATIENT
Start: 2023-12-28 | End: 2024-02-08 | Stop reason: SDUPTHER

## 2023-12-28 RX ORDER — NORGESTIMATE AND ETHINYL ESTRADIOL 7DAYSX3 LO
KIT ORAL
COMMUNITY
Start: 2021-03-29 | End: 2023-12-28 | Stop reason: SDUPTHER

## 2023-12-28 NOTE — PROGRESS NOTES
Subjective:     CC: Weight loss medication    HPI:   Michelle presents today to follow-up on weight loss medications.    Problem   Bmi 33.0-33.9,Adult    Patient attempted to  Contrave after last visit but she was told we are waiting for a prior Auth.  She asked for phentermine for the interim, which I prescribed and she picked up, she is still taking it and is on her last couple of pills.  Tolerating well, denies any heart palpitations or tachycardia.  She continues to work to stick with her exercise and diet regimen.  She is willing to take bupropion and naltrexone separately if Contrave cannot be approved.         Social Hx:  Enjoys dance as exercise.  Does some light weights.  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        Current Outpatient Medications Ordered in Epic   Medication Sig Dispense Refill    naltrexone (DEPADE) 50 MG Tab Take 1 Tablet by mouth every day for 30 days. 30 Tablet 0    buPROPion (WELLBUTRIN) 100 MG Tab Take 1 Tablet by mouth 2 times a day. 60 Tablet 3    Norgestim-Eth Estrad Triphasic (TRI-LO-SPRINTEC) 0.18/0.215/0.25 MG-25 MCG Tab TRI-LO-SPRINTEC 0.18/0.215/0.25 MG-25 MCG TABS 28 Tablet 11    phentermine 30 MG capsule Take 1 Capsule by mouth every morning for 60 days. 30 Capsule 1    Naltrexone-buPROPion HCl ER 8-90 MG TABLET SR 12 HR Take 1 Tablet by mouth every day. 60 Tablet 1    Liraglutide -Weight Management 18 MG/3ML Solution Pen-injector Inject 0.6 mL under the skin every day. 3 mL 3    sulfamethoxazole-trimethoprim (BACTRIM DS) 800-160 MG tablet BACTRIM -160 MG TABS (Patient not taking: Reported on 12/28/2023)       No current [facility]-ordered facility-administered medications on file.       Past Medical History:   Diagnosis Date    Anemia     with pregnancy 2013        Past Surgical History:   Procedure Laterality Date    PRIMARY C SECTION  4/29/2013    Performed by Lino Yanes M.D. at [facility]        Family History   Family history unknown: Yes     "        Objective:     Exam:  /79 (BP Location: Left arm, Patient Position: Sitting, BP Cuff Size: Adult)   Pulse 65   Resp 14   Ht 1.549 m (5' 1\")   Wt 81.2 kg (179 lb)   SpO2 97%   BMI 33.82 kg/m²  Body mass index is 33.82 kg/m².    Gen: Alert and oriented, No apparent distress.  Head:  NCAT, EOMI, sclera clear without discharge  Neck: Neck is supple   Lungs: Normal effort  Abd:   Non-distended, soft  Ext: No clubbing, cyanosis, edema.  MSK: Unassisted gait  Derm: No lesions on exposed skin  Psych: normal mood and affect        Assessment & Plan:     28 y.o. female with the following -     Problem List Items Addressed This Visit       BMI 33.0-33.9,adult     Discussed with patient possible side effects of elevated blood pressure and panic attacks with Contrave, patient verbalized understanding.  Patient states she is not taking her birth control because she is not sexually active.  Willing to have birth control on hand if she becomes sexually active, counseled about not taking this medication if she is trying to become pregnant or becomes pregnant.  - Naltrexone 50 mg prescribed for patient to cut into fourths, may take half a pill if that is more feasible.  She will also take bupropion 100 mg in the morning.            Follow-up: 2 months    Alexandra Iqbal D.O.  PGY-2          "

## 2023-12-28 NOTE — ASSESSMENT & PLAN NOTE
Discussed with patient possible side effects of elevated blood pressure and panic attacks with Contrave, patient verbalized understanding.  Patient states she is not taking her birth control because she is not sexually active.  Willing to have birth control on hand if she becomes sexually active, counseled about not taking this medication if she is trying to become pregnant or becomes pregnant.  - Naltrexone 50 mg prescribed for patient to cut into fourths, may take half a pill if that is more feasible.  She will also take bupropion 100 mg in the morning.  -Birth control pill refilled to have on hand if she does become sexually active.

## 2024-02-08 RX ORDER — NALTREXONE HYDROCHLORIDE 50 MG/1
50 TABLET, FILM COATED ORAL DAILY
Qty: 30 TABLET | Refills: 0 | Status: SHIPPED | OUTPATIENT
Start: 2024-02-08 | End: 2024-03-17

## 2024-02-08 RX ORDER — BUPROPION HYDROCHLORIDE 100 MG/1
100 TABLET ORAL 2 TIMES DAILY
Qty: 60 TABLET | Refills: 3 | Status: SHIPPED | OUTPATIENT
Start: 2024-02-08 | End: 2024-03-15 | Stop reason: SDUPTHER

## 2024-03-15 NOTE — TELEPHONE ENCOUNTER
Received request via: Pharmacy    Was the patient seen in the last year in this department? Yes    Does the patient have an active prescription (recently filled or refills available) for medication(s) requested? No    Pharmacy Name: [facility]    Does the patient have [facility] and need 100 day supply (blood pressure, diabetes and cholesterol meds only)? Patient does not have SCP

## 2024-03-15 NOTE — TELEPHONE ENCOUNTER
Received request via: Pharmacy    Was the patient seen in the last year in this department? Yes    Does the patient have an active prescription (recently filled or refills available) for medication(s) requested? No    Pharmacy Name: [id number] - [place], [place] - [address]     Does the patient have [facility] and need 100 day supply (blood pressure, diabetes and cholesterol meds only)? Patient does not have SCP

## 2024-03-17 RX ORDER — NALTREXONE HYDROCHLORIDE 50 MG/1
50 TABLET, FILM COATED ORAL DAILY
Qty: 30 TABLET | Refills: 0 | Status: SHIPPED | OUTPATIENT
Start: 2024-03-17

## 2024-03-17 RX ORDER — BUPROPION HYDROCHLORIDE 100 MG/1
100 TABLET ORAL 2 TIMES DAILY
Qty: 60 TABLET | Refills: 3 | Status: SHIPPED | OUTPATIENT
Start: 2024-03-17

## 2024-07-09 ENCOUNTER — OFFICE VISIT (OUTPATIENT)
Dept: MEDICAL GROUP | Facility: CLINIC | Age: 29
End: 2024-07-09
Payer: COMMERCIAL

## 2024-07-09 VITALS
TEMPERATURE: 98.1 F | BODY MASS INDEX: 34.74 KG/M2 | SYSTOLIC BLOOD PRESSURE: 95 MMHG | OXYGEN SATURATION: 97 % | RESPIRATION RATE: 16 BRPM | DIASTOLIC BLOOD PRESSURE: 65 MMHG | HEIGHT: 61 IN | WEIGHT: 184 LBS | HEART RATE: 63 BPM

## 2024-07-09 PROCEDURE — 99213 OFFICE O/P EST LOW 20 MIN: CPT | Mod: GE

## 2024-07-09 ASSESSMENT — PATIENT HEALTH QUESTIONNAIRE - PHQ9: CLINICAL INTERPRETATION OF PHQ2 SCORE: 0

## 2024-07-29 ENCOUNTER — TELEPHONE (OUTPATIENT)
Dept: MEDICAL GROUP | Facility: CLINIC | Age: 29
End: 2024-07-29
Payer: COMMERCIAL

## 2025-01-23 ENCOUNTER — OFFICE VISIT (OUTPATIENT)
Dept: MEDICAL GROUP | Facility: CLINIC | Age: 30
End: 2025-01-23
Payer: COMMERCIAL

## 2025-01-23 ENCOUNTER — HOSPITAL ENCOUNTER (OUTPATIENT)
Facility: MEDICAL CENTER | Age: 30
End: 2025-01-23
Attending: NURSE PRACTITIONER
Payer: COMMERCIAL

## 2025-01-23 VITALS
HEART RATE: 72 BPM | BODY MASS INDEX: 30.21 KG/M2 | SYSTOLIC BLOOD PRESSURE: 93 MMHG | DIASTOLIC BLOOD PRESSURE: 68 MMHG | WEIGHT: 160 LBS | HEIGHT: 61 IN | RESPIRATION RATE: 16 BRPM | OXYGEN SATURATION: 98 %

## 2025-01-23 DIAGNOSIS — Z23 NEED FOR VACCINATION: ICD-10-CM

## 2025-01-23 DIAGNOSIS — K21.9 GASTROESOPHAGEAL REFLUX DISEASE WITHOUT ESOPHAGITIS: ICD-10-CM

## 2025-01-23 DIAGNOSIS — E66.9 OBESITY (BMI 30-39.9): ICD-10-CM

## 2025-01-23 PROCEDURE — 83013 H PYLORI (C-13) BREATH: CPT

## 2025-01-23 PROCEDURE — 99213 OFFICE O/P EST LOW 20 MIN: CPT | Mod: GE | Performed by: FAMILY MEDICINE

## 2025-01-23 ASSESSMENT — PATIENT HEALTH QUESTIONNAIRE - PHQ9: CLINICAL INTERPRETATION OF PHQ2 SCORE: 0

## 2025-01-23 NOTE — PROGRESS NOTES
Subjective:     CC:   Chief Complaint   Patient presents with    Annual Exam    Gastrophageal Reflux       HPI:   Michelle presents today with:    - Cervical Cancer Screening: Has never had abnormal pap smear. Not sexually active currently. No concern that she might be pregnant right now. She is currently on her period and does not wish to have pap smear this visit.     - Acid Reflux: Patient reports 2-3months she has been spitting up acid. Feels it most in the morning. Has also vomited for the past 2 days. Does report some associated dry heaving. Reports the color was bright red, but denies blood. Denies any association with types of food, after eating. Does happen throughout the day. Reports epigastric abdominal pain that comes and goes, for a few days at a time. Has tried Pepto-Bismol and Tums, without improvement. Started taking Ozempic about 3 months ago for weight loss. Has had about 24 pound weight loss since 7/2024.     Current Outpatient Medications Ordered in Epic   Medication Sig Dispense Refill    omeprazole (PRILOSEC) 20 MG delayed-release capsule Take 1 Capsule by mouth every day for 60 days. 60 Capsule 0    Tirzepatide-Weight Management 2.5 MG/0.5ML Solution Auto-injector Inject 1 Dose under the skin every 7 days. (Patient not taking: Reported on 1/23/2025) 0.5 mL 3    naltrexone (DEPADE) 50 MG Tab Take 1 tablet by mouth once daily for 30 days (Patient not taking: Reported on 1/23/2025) 30 Tablet 0    buPROPion (WELLBUTRIN) 100 MG Tab Take 1 Tablet by mouth 2 times a day. (Patient not taking: Reported on 1/23/2025) 60 Tablet 3    sulfamethoxazole-trimethoprim (BACTRIM DS) 800-160 MG tablet BACTRIM -160 MG TABS (Patient not taking: Reported on 1/23/2025)      Norgestim-Eth Estrad Triphasic (TRI-LO-SPRINTEC) 0.18/0.215/0.25 MG-25 MCG Tab TRI-LO-SPRINTEC 0.18/0.215/0.25 MG-25 MCG TABS (Patient not taking: Reported on 1/23/2025) 28 Tablet 11     No current [facility]-ordered facility-administered  "medications on file.     ROS:  Negative except for above in HPI    Objective:     Exam:  BP 93/68 (BP Location: Left arm, Patient Position: Sitting, BP Cuff Size: Adult)   Pulse 72   Resp 16   Ht 1.549 m (5' 1\")   Wt 72.6 kg (160 lb)   SpO2 98%   BMI 30.23 kg/m²  Body mass index is 30.23 kg/m².    Gen: Alert and oriented, No apparent distress.  HEENT: NCAT, MMM, No lymphadenopathy  Lungs: Normal effort, CTA bilaterally, no wheezes, rhonchi, or rales  CV: Regular rate and rhythm. No murmurs, rubs, or gallops. Radial pulses palpable bilat  Abd: Soft, non-distended, no guarding, no rebound. Tenderness to palpation over epigastric area.   Ext: No clubbing, cyanosis, edema.  Neuro: Non-focal    Labs: No new labs    Assessment & Plan:     29 y.o. female with the following -     1. Gastroesophageal reflux disease without esophagitis  Acute problem, uncontrolled.  Patient with new onset acid reflux type symptoms associated with nausea and vomiting.  These have started after she started taking Ozempic.  Concern is for most likely side effect of Ozempic as cause of current symptoms, however, will attempt symptomatic treatment with omeprazole 20 mg daily and rule out H. pylori infection at this time   - H. PYLORI BREATH TEST  - omeprazole (PRILOSEC) 20 MG delayed-release capsule; Take 1 Capsule by mouth every day for 60 days.  Dispense: 60 Capsule; Refill: 0    2. Need for vaccination  - INFLUENZA VACCINE TRI INJ (PF)   - Tdap Vaccine =>6YO IM    3. Need for Cervical Cancer Screening  Patient currently on menstrual period and does not desire pap smear this visit. Discussed with patient scheduling pap smear for earliest convenience.     Return in about 3 months (around 4/23/2025).    Rosalba Piper M.D.  PGY-2  [facility]  " Minocycline Pregnancy And Lactation Text: This medication is Pregnancy Category D and not consider safe during pregnancy. It is also excreted in breast milk.

## 2025-01-24 LAB — UREA BREATH TEST QL: NEGATIVE

## 2025-03-13 NOTE — PROGRESS NOTES
Subjective     Michelle Mccartney is a 28 y.o. female who presents with Follow-Up            HPI    ROS           Objective     There were no vitals taken for this visit.     Physical Exam  Skin:     Findings: Lesion present.                                    Assessment & Plan        There are no diagnoses linked to this encounter.                 Opt out

## 2025-03-19 ENCOUNTER — APPOINTMENT (OUTPATIENT)
Dept: MEDICAL GROUP | Facility: CLINIC | Age: 30
End: 2025-03-19
Payer: COMMERCIAL

## 2025-03-21 ENCOUNTER — OFFICE VISIT (OUTPATIENT)
Dept: MEDICAL GROUP | Facility: CLINIC | Age: 30
End: 2025-03-21
Payer: COMMERCIAL

## 2025-03-21 VITALS
BODY MASS INDEX: 28.84 KG/M2 | OXYGEN SATURATION: 96 % | HEART RATE: 80 BPM | TEMPERATURE: 96.8 F | HEIGHT: 63 IN | SYSTOLIC BLOOD PRESSURE: 128 MMHG | WEIGHT: 162.8 LBS | DIASTOLIC BLOOD PRESSURE: 62 MMHG

## 2025-03-21 DIAGNOSIS — M54.2 NECK PAIN: Primary | ICD-10-CM

## 2025-03-21 PROCEDURE — 99213 OFFICE O/P EST LOW 20 MIN: CPT | Performed by: STUDENT IN AN ORGANIZED HEALTH CARE EDUCATION/TRAINING PROGRAM

## 2025-03-21 PROCEDURE — 3074F SYST BP LT 130 MM HG: CPT | Performed by: STUDENT IN AN ORGANIZED HEALTH CARE EDUCATION/TRAINING PROGRAM

## 2025-03-21 PROCEDURE — 3078F DIAST BP <80 MM HG: CPT | Performed by: STUDENT IN AN ORGANIZED HEALTH CARE EDUCATION/TRAINING PROGRAM

## 2025-03-21 NOTE — PROGRESS NOTES
"Family Medicine Clinic Note    Date: 3/21/2025    PPE used by provider during encounter: surgical mask  Consent for TAYLOR love obtained - using [employer]    ASSESSMENT & PLAN    1. Neck pain  Chronic neck pain for six months, primarily in trapezius muscles, likely muscular origin. Discussed physical therapy and OMT. Imaging not indicated unless symptoms persist.    - Schedule appointment with Doctor Iqbal for osteopathic manipulative treatment (OMT).  - Consider Voltaren gel for topical pain relief as needed.  - Referral to Physical Therapy     Follow up with Dr. Iqbal    SUBJECTIVE    Chief Complaint   Patient presents with    Follow-Up     Neck pain       Michelle is a 29 y.o. person presenting today with the following concerns:    #neck pain    Neck discomfort has been present for six months, located at the back of the neck. Doesn't describe it as pain but noticeable sensation rated 6 to 7 out of 10. The pain does not radiate to the shoulders and is not sharp or dull. It is noticeable when turning the head but not exacerbated by specific movements.    The sensation affects daily activities such as driving, sleeping, and sitting. Despite reducing phone usage, the feeling persists. Fitness dance does not worsen the pain, and it does not wake them from sleep, although they feel tense upon waking.    Stretching exercises sometimes exacerbate the pain. Icy Hot provides relief, reducing the pain to zero. They have not used medications like Tylenol or Advil for this issue.     OBJECTIVE  /62   Pulse 80   Temp 36 °C (96.8 °F) (Temporal)   Ht 1.6 m (5' 3\")   Wt 73.8 kg (162 lb 12.8 oz)   SpO2 96%   BMI 28.84 kg/m²      General: Well appearing, NAD  Lungs: breathing comfortably  Neuro: 2-12 grossly intact  Neck: no spinal tenderness, very tight neck muscles and trapezius. Chin to chest causes stretching sensation, rom normal. Spurlings negative for radiculopathy.      Tyler Venegas MD   " Family and Community Medicine  [facility] [place]  [facility]

## 2025-03-21 NOTE — PATIENT INSTRUCTIONS
Let's try physical therapy. I also want you to schedule a visit with Dr. Cool for some Osteopathic manipulative treatment    Schedule with dr cool for T

## 2025-03-25 ENCOUNTER — APPOINTMENT (OUTPATIENT)
Dept: MEDICAL GROUP | Facility: CLINIC | Age: 30
End: 2025-03-25
Payer: COMMERCIAL

## 2025-03-26 NOTE — Clinical Note
REFERRAL APPROVAL NOTICE         Sent on March 26, 2025                   Michelle Mccartney  [address]   [address]  [place] [address]                   Dear Ms. Mccartney,    After a careful review of the medical information and benefit coverage, [facility] has processed your referral. See below for additional details.    If applicable, you must be actively enrolled with your insurance for coverage of the authorized service. If you have any questions regarding your coverage, please contact your insurance directly.    REFERRAL INFORMATION   Referral #:  [id number]  Referred-To Department    Referred-By Provider:  Physical Therapy    Tyler Venegas M.D.   [facility] [address]      [address]  [place] [address] [phone] [address]  [address]  [place] [id number] [phone]    Referral Start Date:  03/21/2025  Referral End Date:   03/21/2026             SCHEDULING  If you do not already have an appointment, please call [phone] to make an appointment.     MORE INFORMATION  If you do not already have a [employer] account, sign up at: [email or web address]  You can access your medical information, make appointments, see lab results, billing information, and more.  If you have questions regarding this referral, please contact  the [facility] Referrals department at:             [phone]. Monday - Friday 8:00AM - 5:00PM.     Sincerely,    [facility]

## 2025-04-08 ENCOUNTER — APPOINTMENT (OUTPATIENT)
Dept: MEDICAL GROUP | Facility: CLINIC | Age: 30
End: 2025-04-08
Payer: COMMERCIAL

## 2025-04-08 ENCOUNTER — APPOINTMENT (OUTPATIENT)
Dept: RADIOLOGY | Facility: CLINIC | Age: 30
End: 2025-04-08
Payer: COMMERCIAL

## 2025-04-08 ENCOUNTER — RESULTS FOLLOW-UP (OUTPATIENT)
Dept: MEDICAL GROUP | Facility: CLINIC | Age: 30
End: 2025-04-08

## 2025-04-08 VITALS
HEART RATE: 62 BPM | OXYGEN SATURATION: 97 % | BODY MASS INDEX: 32.47 KG/M2 | DIASTOLIC BLOOD PRESSURE: 64 MMHG | WEIGHT: 172 LBS | TEMPERATURE: 97.3 F | HEIGHT: 61 IN | SYSTOLIC BLOOD PRESSURE: 102 MMHG

## 2025-04-08 DIAGNOSIS — M54.2 NECK PAIN: ICD-10-CM

## 2025-04-08 PROCEDURE — 99213 OFFICE O/P EST LOW 20 MIN: CPT | Mod: GE

## 2025-04-08 PROCEDURE — 3074F SYST BP LT 130 MM HG: CPT | Mod: GE

## 2025-04-08 PROCEDURE — 3078F DIAST BP <80 MM HG: CPT | Mod: GE

## 2025-04-08 PROCEDURE — 72050 X-RAY EXAM NECK SPINE 4/5VWS: CPT | Mod: TC | Performed by: FAMILY MEDICINE

## 2025-04-08 NOTE — PROGRESS NOTES
OMT Visit    CC: Neck pain    Patient was seen by Dr. Venegas on 3/21 when she noted neck pain for the last 6 months.  He suspected benign cause and referred her to OMT and PT.    OMT HPI:  - onset/context: 6 months ago, spontaneously, never had before, no injury.  - location/radiation: back of neck down the traps.   - timing: all the time but worse some days.    - alleviating factors: ibuprofen/tylenol, icy hot (short relief)  - aggravating factors: driving, sleeping (worse when she wakes up), sometimes sitting.    - therapies attempted: None, PT referral pending.  - imaging: none previously, cervical spine x-ray 4/8 WNL.  - associated symptoms: No numbness/tingling down arms.  Sometimes does get a headache from it.       Objective:    Vitals:    04/08/25 1555   BP: 102/64   Pulse: 62   Temp: 36.3 °C (97.3 °F)   SpO2: 97%       Physical Exam:   General: alert, NAD, healthy appearing   HEENT: normocephalic, atraumatic; no scleral icterus  Neck: FROM, no meningeal signs, negative Spurling's test   CV: No cyanosis, no neck vein distension  Resp: No respiratory distress  Skin: pink, dry, warm, no jaundice  Psych: appropriate affect  MSK: strength 5/5 BUE and BLE, no bony spinous process tenderness  Neuro: CN II-XII grossly intact b/l, sensation intact b/l UE and LE         ASSESSMENT/PLAN:  Muscular neck pain  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  Cervical spine x-ray done today was completely normal.  - Gave patient double chin exercise for strengthening  - Show patient fascial neck stretching to do daily  - Encouraged patient to follow-up with PT referral, gave information  - OMT as she finds beneficial  - Trial Voltaren gel    Please see note below for OMT findings and treatment.    OMT Performed:   The procedure note of osteopathic manipulative treatment (OMT) was indicated for noted findings of somatic dysfunction and  was discussed with the patient. Risks, benefits, and alternatives were discussed. Questions were answered to the patient’s satisfaction, and verbal consent was obtained. The following areas of somatic dysfunction were treated with the following modalities:    OMT Exam and Treatment:  Cranium: Suboccipital tension treated with suboccipital release.  Right occipital mastoid restriction treated with the spread.  Cervical: C6 flexed, side bent left and rotated left-treated with BLT.  Thoracic: Paraspinal hypertonicity treated with MFR.  Ribs: Left first rib subluxed inferiorly-treated with joint mobilization.  Abd: Diaphragm restriction treated with MFR.       The patient tolerated the procedure well with good reduction in somatic dysfunction and no obvious post-treatment reactions noted. The patient was counseled that they may experience muscle aches or mild discomfort over the next 24-48 hours. I expressed the importance of hydration. Patient was instructed to call the office or go to the emergency department immediately if symptoms worsen significantly. Patient may apply an ice pack or heat as needed to sore areas for no longer than 20 minutes every 2 hours while awake. Patient should avoid aggravating activity. OTC Ibuprofen or Tylenol may be used as needed for pain    F/u: PRN    Alexandra Iqbal D.O.

## 2025-07-17 ENCOUNTER — OFFICE VISIT (OUTPATIENT)
Dept: INTERNAL MEDICINE | Facility: OTHER | Age: 30
End: 2025-07-17
Payer: COMMERCIAL

## 2025-07-17 VITALS
HEART RATE: 70 BPM | TEMPERATURE: 99.8 F | DIASTOLIC BLOOD PRESSURE: 71 MMHG | SYSTOLIC BLOOD PRESSURE: 111 MMHG | HEIGHT: 61 IN | WEIGHT: 170 LBS | BODY MASS INDEX: 32.1 KG/M2 | OXYGEN SATURATION: 99 %

## 2025-07-17 DIAGNOSIS — B00.1 COLD SORE: ICD-10-CM

## 2025-07-17 DIAGNOSIS — Z72.51 HIGH RISK SEXUAL BEHAVIOR, UNSPECIFIED TYPE: Primary | ICD-10-CM

## 2025-07-17 PROCEDURE — 99213 OFFICE O/P EST LOW 20 MIN: CPT | Mod: GC

## 2025-07-17 PROCEDURE — 3078F DIAST BP <80 MM HG: CPT | Mod: GC

## 2025-07-17 PROCEDURE — 3074F SYST BP LT 130 MM HG: CPT | Mod: GC

## 2025-07-17 RX ORDER — VALACYCLOVIR HYDROCHLORIDE 500 MG/1
500 TABLET, FILM COATED ORAL 2 TIMES DAILY
Qty: 30 TABLET | Refills: 1 | Status: SHIPPED | OUTPATIENT
Start: 2025-07-17

## 2025-07-17 NOTE — PROGRESS NOTES
Patient Care Team:  Alexandra Iqbal D.O. as PCP - General (Family Medicine)    Chief Complaint   Patient presents with    Requesting Labs     STI (wants a whole panel including herpes)      History of Present Illness:   Michelle Mccartney is a 30 y.o. female with PMH as below who presents for acute visit for:   1. High risk sexual behavior, unspecified type    2. Cold sore        #Sexual Health  The patient is concerned about potential STI exposure following unprotected sex with a male partner last Friday. They have a history of cold sores since childhood, which occurred again after the encounter, causing concern about HSV-1 transmission to the genital area. The patient reports not having any current symptoms such as fever, rash, swelling, discharge, or genital ulcers. The patient has no history of sexually transmitted diseases and has not been sexually active for three years prior to this event. The last STI testing was several years ago. They expressed concerns about HSV-2 transmission and are aware that their partner may have been exposed to the virus.    Review of Systems:  Constitutional: Negative for fever or chills.  HEENT: Positive for recurrent cold sores on the lips. Negative for congestion or sore throat.  Respiratory: Negative for shortness of breath.  Cardiovascular: Negative for chest pain or palpitations.  Gastrointestinal: Negative for abdominal pain, constipation, diarrhea, or vomiting.  Genitourinary: Negative for dysuria, frequency, hematuria, and urgency. No genital ulcers or lesions.  Musculoskeletal: Negative for falls and joint pain.  Skin: Negative for rash.  Neurological: Negative for dizziness, headaches, focal weakness, and loss of consciousness.  Psychiatric/Behavioral: Negative for mood or behavior changes.  All other systems reviewed and are negative.    Past Medical History:   Past Medical History[1]  Problem List[2]    Medications:     Current Outpatient Medications:     " valACYclovir, 500 mg, Oral, BID, Taking     Social History:  Social History[3]    Objective:  Vitals:   /71 (BP Location: Left arm, Patient Position: Sitting, BP Cuff Size: Adult)   Pulse 70   Temp 37.7 °C (99.8 °F) (Temporal)   Ht 1.549 m (5' 1\")   Wt 77.1 kg (170 lb)   SpO2 99%  Body mass index is 32.12 kg/m².    Physical Exam:  General: Alert and oriented, in no acute distress, generally well-appearing.  HEENT: Normocephalic, atraumatic, extraocular movement intact, external ears normal, moist mucous membranes.  Cardiovascular: Regular rate and rhythm, no murmurs or gallops.  Respiratory: No respiratory distress, breath sounds clear to auscultation bilaterally, without wheezing, rhonchi, or rales.  Abdominal: Soft, non-tender, non-distended, no organomegaly.  Musculoskeletal: No deformity, swelling, or edema.  Skin: No lesion or rash.  Neurological: Alert and oriented x4, no focal deficits or weakness.  Psychiatric: Appropriate mood and affect.    Assessment and Plan:    30 y.o. female with:     1. 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  The patient has risk factors due to recent unprotected sexual activity and reports of partner exposure to HSV-2.  - Recommend completion of HPV series  - Recommend pap smear  - Workup:   - Chlamydia/GC, PCR (Genital/Anal swab); Future  - HIV AG/AB Combo Assay Screening; Future  - T.Pallidum AB GELA (Screening); Future  - Trichomonas Vaginalis by TMA  - Hepatitis C Virus Antibody; Future  - HSV 1/2 IGG W/ TYPE SPECIFIC RFLX; Future  --Follow-up after lab results for further management and counseling  --Counseled on barrier protection and safe sexual practices    2. Cold sore  Previously HSV1+  - counseled on preventative measures  - valACYclovir (VALTREX) 500 MG Tab; Take 1 Tablet by mouth 2 times a day.  Dispense: 30 Tablet; Refill: 1    Follow Up:  With PCP    Vincent Cavazos, " MD  Internal Medicine PGY-3  [facility]         [1]   Past Medical History:  Diagnosis Date    Anemia     with pregnancy 2013   [2]   Patient Active Problem List  Diagnosis    Vaginal odor    BMI 34.0-34.9,adult    Neck pain   [3]   Social History  Tobacco Use    Smoking status: Never    Smokeless tobacco: Never   Vaping Use    Vaping status: Never Used   Substance Use Topics    Alcohol use: Yes     Comment: Socially     Drug use: No